# Patient Record
Sex: FEMALE | Race: BLACK OR AFRICAN AMERICAN | NOT HISPANIC OR LATINO | Employment: OTHER | URBAN - METROPOLITAN AREA
[De-identification: names, ages, dates, MRNs, and addresses within clinical notes are randomized per-mention and may not be internally consistent; named-entity substitution may affect disease eponyms.]

---

## 2020-09-27 ENCOUNTER — HOSPITAL ENCOUNTER (INPATIENT)
Facility: HOSPITAL | Age: 80
LOS: 1 days | Discharge: HOME | DRG: 305 | End: 2020-09-28
Attending: EMERGENCY MEDICINE | Admitting: INTERNAL MEDICINE
Payer: MEDICARE

## 2020-09-27 ENCOUNTER — APPOINTMENT (EMERGENCY)
Dept: CT IMAGING | Facility: HOSPITAL | Age: 80
DRG: 305 | End: 2020-09-27
Payer: MEDICARE

## 2020-09-27 ENCOUNTER — APPOINTMENT (EMERGENCY)
Dept: RADIOLOGY | Facility: HOSPITAL | Age: 80
DRG: 305 | End: 2020-09-27
Payer: MEDICARE

## 2020-09-27 DIAGNOSIS — R19.8 ALTERNATING CONSTIPATION AND DIARRHEA: ICD-10-CM

## 2020-09-27 DIAGNOSIS — I10 HYPERTENSION: ICD-10-CM

## 2020-09-27 DIAGNOSIS — E87.6 HYPOKALEMIA: ICD-10-CM

## 2020-09-27 DIAGNOSIS — I16.0 HYPERTENSIVE URGENCY: ICD-10-CM

## 2020-09-27 DIAGNOSIS — R41.82 ALTERED MENTAL STATUS: Primary | ICD-10-CM

## 2020-09-27 DIAGNOSIS — R53.1 WEAKNESS: ICD-10-CM

## 2020-09-27 PROBLEM — G93.40 ACUTE ENCEPHALOPATHY: Status: ACTIVE | Noted: 2020-09-27

## 2020-09-27 PROBLEM — K59.00 CONSTIPATION: Status: ACTIVE | Noted: 2020-09-27

## 2020-09-27 LAB
ALBUMIN SERPL BCP-MCNC: 3.8 G/DL (ref 3.5–5)
ALP SERPL-CCNC: 62 U/L (ref 46–116)
ALT SERPL W P-5'-P-CCNC: 20 U/L (ref 12–78)
ANION GAP SERPL CALCULATED.3IONS-SCNC: 10 MMOL/L (ref 4–13)
AST SERPL W P-5'-P-CCNC: 19 U/L (ref 5–45)
BACTERIA UR QL AUTO: ABNORMAL /HPF
BASOPHILS # BLD AUTO: 0.07 THOUSANDS/ΜL (ref 0–0.1)
BASOPHILS NFR BLD AUTO: 2 % (ref 0–1)
BILIRUB SERPL-MCNC: 0.8 MG/DL (ref 0.2–1)
BILIRUB UR QL STRIP: NEGATIVE
BUN SERPL-MCNC: 12 MG/DL (ref 5–25)
CALCIUM SERPL-MCNC: 9.5 MG/DL (ref 8.3–10.1)
CHLORIDE SERPL-SCNC: 103 MMOL/L (ref 100–108)
CLARITY UR: ABNORMAL
CO2 SERPL-SCNC: 30 MMOL/L (ref 21–32)
COLOR UR: ABNORMAL
CREAT SERPL-MCNC: 0.97 MG/DL (ref 0.6–1.3)
EOSINOPHIL # BLD AUTO: 0.18 THOUSAND/ΜL (ref 0–0.61)
EOSINOPHIL NFR BLD AUTO: 4 % (ref 0–6)
ERYTHROCYTE [DISTWIDTH] IN BLOOD BY AUTOMATED COUNT: 13.9 % (ref 11.6–15.1)
GFR SERPL CREATININE-BSD FRML MDRD: 65 ML/MIN/1.73SQ M
GLUCOSE SERPL-MCNC: 79 MG/DL (ref 65–140)
GLUCOSE SERPL-MCNC: 98 MG/DL (ref 65–140)
GLUCOSE UR STRIP-MCNC: NEGATIVE MG/DL
HCT VFR BLD AUTO: 40.2 % (ref 34.8–46.1)
HGB BLD-MCNC: 12.6 G/DL (ref 11.5–15.4)
HGB UR QL STRIP.AUTO: ABNORMAL
IMM GRANULOCYTES # BLD AUTO: 0.02 THOUSAND/UL (ref 0–0.2)
IMM GRANULOCYTES NFR BLD AUTO: 0 % (ref 0–2)
KETONES UR STRIP-MCNC: NEGATIVE MG/DL
LACTATE SERPL-SCNC: 0.7 MMOL/L (ref 0.5–2)
LEUKOCYTE ESTERASE UR QL STRIP: ABNORMAL
LYMPHOCYTES # BLD AUTO: 1.37 THOUSANDS/ΜL (ref 0.6–4.47)
LYMPHOCYTES NFR BLD AUTO: 30 % (ref 14–44)
MCH RBC QN AUTO: 26.5 PG (ref 26.8–34.3)
MCHC RBC AUTO-ENTMCNC: 31.3 G/DL (ref 31.4–37.4)
MCV RBC AUTO: 85 FL (ref 82–98)
MONOCYTES # BLD AUTO: 0.49 THOUSAND/ΜL (ref 0.17–1.22)
MONOCYTES NFR BLD AUTO: 11 % (ref 4–12)
NEUTROPHILS # BLD AUTO: 2.42 THOUSANDS/ΜL (ref 1.85–7.62)
NEUTS SEG NFR BLD AUTO: 53 % (ref 43–75)
NITRITE UR QL STRIP: NEGATIVE
NON-SQ EPI CELLS URNS QL MICRO: ABNORMAL /HPF
NRBC BLD AUTO-RTO: 0 /100 WBCS
NT-PROBNP SERPL-MCNC: 390 PG/ML
PH UR STRIP.AUTO: 7.5 [PH]
PLATELET # BLD AUTO: 304 THOUSANDS/UL (ref 149–390)
PMV BLD AUTO: 10.6 FL (ref 8.9–12.7)
POTASSIUM SERPL-SCNC: 2.6 MMOL/L (ref 3.5–5.3)
PROT SERPL-MCNC: 7.9 G/DL (ref 6.4–8.2)
PROT UR STRIP-MCNC: NEGATIVE MG/DL
RBC # BLD AUTO: 4.76 MILLION/UL (ref 3.81–5.12)
RBC #/AREA URNS AUTO: ABNORMAL /HPF
SODIUM SERPL-SCNC: 143 MMOL/L (ref 136–145)
SP GR UR STRIP.AUTO: 1.01 (ref 1–1.03)
TROPONIN I SERPL-MCNC: <0.02 NG/ML
TSH SERPL DL<=0.05 MIU/L-ACNC: 1.13 UIU/ML (ref 0.36–3.74)
UROBILINOGEN UR QL STRIP.AUTO: 0.2 E.U./DL
WBC # BLD AUTO: 4.55 THOUSAND/UL (ref 4.31–10.16)
WBC #/AREA URNS AUTO: ABNORMAL /HPF

## 2020-09-27 PROCEDURE — 84484 ASSAY OF TROPONIN QUANT: CPT | Performed by: NURSE PRACTITIONER

## 2020-09-27 PROCEDURE — G1004 CDSM NDSC: HCPCS

## 2020-09-27 PROCEDURE — 93005 ELECTROCARDIOGRAM TRACING: CPT

## 2020-09-27 PROCEDURE — 99285 EMERGENCY DEPT VISIT HI MDM: CPT | Performed by: NURSE PRACTITIONER

## 2020-09-27 PROCEDURE — 36415 COLL VENOUS BLD VENIPUNCTURE: CPT | Performed by: NURSE PRACTITIONER

## 2020-09-27 PROCEDURE — 83605 ASSAY OF LACTIC ACID: CPT | Performed by: NURSE PRACTITIONER

## 2020-09-27 PROCEDURE — 84443 ASSAY THYROID STIM HORMONE: CPT | Performed by: INTERNAL MEDICINE

## 2020-09-27 PROCEDURE — 96366 THER/PROPH/DIAG IV INF ADDON: CPT

## 2020-09-27 PROCEDURE — 96365 THER/PROPH/DIAG IV INF INIT: CPT

## 2020-09-27 PROCEDURE — 82948 REAGENT STRIP/BLOOD GLUCOSE: CPT

## 2020-09-27 PROCEDURE — 81001 URINALYSIS AUTO W/SCOPE: CPT | Performed by: NURSE PRACTITIONER

## 2020-09-27 PROCEDURE — 85025 COMPLETE CBC W/AUTO DIFF WBC: CPT | Performed by: NURSE PRACTITIONER

## 2020-09-27 PROCEDURE — 74177 CT ABD & PELVIS W/CONTRAST: CPT

## 2020-09-27 PROCEDURE — 83880 ASSAY OF NATRIURETIC PEPTIDE: CPT | Performed by: NURSE PRACTITIONER

## 2020-09-27 PROCEDURE — 99223 1ST HOSP IP/OBS HIGH 75: CPT | Performed by: INTERNAL MEDICINE

## 2020-09-27 PROCEDURE — 80053 COMPREHEN METABOLIC PANEL: CPT | Performed by: NURSE PRACTITIONER

## 2020-09-27 PROCEDURE — 99285 EMERGENCY DEPT VISIT HI MDM: CPT

## 2020-09-27 PROCEDURE — 71045 X-RAY EXAM CHEST 1 VIEW: CPT

## 2020-09-27 PROCEDURE — 70450 CT HEAD/BRAIN W/O DYE: CPT

## 2020-09-27 RX ORDER — POLYETHYLENE GLYCOL 3350 17 G/17G
17 POWDER, FOR SOLUTION ORAL DAILY
Status: DISCONTINUED | OUTPATIENT
Start: 2020-09-27 | End: 2020-09-28 | Stop reason: HOSPADM

## 2020-09-27 RX ORDER — POTASSIUM CHLORIDE 20 MEQ/1
40 TABLET, EXTENDED RELEASE ORAL ONCE
Status: COMPLETED | OUTPATIENT
Start: 2020-09-27 | End: 2020-09-27

## 2020-09-27 RX ORDER — LABETALOL 20 MG/4 ML (5 MG/ML) INTRAVENOUS SYRINGE
10 ONCE
Status: COMPLETED | OUTPATIENT
Start: 2020-09-27 | End: 2020-09-27

## 2020-09-27 RX ORDER — ACETAMINOPHEN 325 MG/1
650 TABLET ORAL EVERY 6 HOURS PRN
Status: DISCONTINUED | OUTPATIENT
Start: 2020-09-27 | End: 2020-09-28 | Stop reason: HOSPADM

## 2020-09-27 RX ORDER — CLONIDINE HYDROCHLORIDE 0.1 MG/1
0.1 TABLET ORAL ONCE
Status: COMPLETED | OUTPATIENT
Start: 2020-09-27 | End: 2020-09-27

## 2020-09-27 RX ORDER — LISINOPRIL 20 MG/1
20 TABLET ORAL DAILY
Status: DISCONTINUED | OUTPATIENT
Start: 2020-09-27 | End: 2020-09-28 | Stop reason: HOSPADM

## 2020-09-27 RX ORDER — DOCUSATE SODIUM 100 MG/1
100 CAPSULE, LIQUID FILLED ORAL 2 TIMES DAILY
Status: DISCONTINUED | OUTPATIENT
Start: 2020-09-27 | End: 2020-09-28 | Stop reason: HOSPADM

## 2020-09-27 RX ORDER — AMLODIPINE BESYLATE 5 MG/1
5 TABLET ORAL DAILY
Status: DISCONTINUED | OUTPATIENT
Start: 2020-09-27 | End: 2020-09-28 | Stop reason: HOSPADM

## 2020-09-27 RX ORDER — SODIUM CHLORIDE, SODIUM GLUCONATE, SODIUM ACETATE, POTASSIUM CHLORIDE, MAGNESIUM CHLORIDE, SODIUM PHOSPHATE, DIBASIC, AND POTASSIUM PHOSPHATE .53; .5; .37; .037; .03; .012; .00082 G/100ML; G/100ML; G/100ML; G/100ML; G/100ML; G/100ML; G/100ML
1000 INJECTION, SOLUTION INTRAVENOUS ONCE
Status: COMPLETED | OUTPATIENT
Start: 2020-09-27 | End: 2020-09-27

## 2020-09-27 RX ORDER — POTASSIUM CHLORIDE 14.9 MG/ML
20 INJECTION INTRAVENOUS ONCE
Status: DISCONTINUED | OUTPATIENT
Start: 2020-09-27 | End: 2020-09-27

## 2020-09-27 RX ORDER — SENNOSIDES 8.6 MG
1 TABLET ORAL DAILY
Status: DISCONTINUED | OUTPATIENT
Start: 2020-09-27 | End: 2020-09-28 | Stop reason: HOSPADM

## 2020-09-27 RX ORDER — LABETALOL 20 MG/4 ML (5 MG/ML) INTRAVENOUS SYRINGE
10 EVERY 4 HOURS PRN
Status: DISCONTINUED | OUTPATIENT
Start: 2020-09-27 | End: 2020-09-28 | Stop reason: HOSPADM

## 2020-09-27 RX ADMIN — SENNOSIDES 8.6 MG: 8.6 TABLET, FILM COATED ORAL at 17:52

## 2020-09-27 RX ADMIN — IOHEXOL 100 ML: 350 INJECTION, SOLUTION INTRAVENOUS at 12:10

## 2020-09-27 RX ADMIN — POLYETHYLENE GLYCOL 3350 17 G: 17 POWDER, FOR SOLUTION ORAL at 17:52

## 2020-09-27 RX ADMIN — CLONIDINE HYDROCHLORIDE 0.1 MG: 0.1 TABLET ORAL at 14:14

## 2020-09-27 RX ADMIN — LISINOPRIL 20 MG: 20 TABLET ORAL at 17:52

## 2020-09-27 RX ADMIN — DOCUSATE SODIUM 100 MG: 100 CAPSULE, LIQUID FILLED ORAL at 17:52

## 2020-09-27 RX ADMIN — SODIUM CHLORIDE, SODIUM GLUCONATE, SODIUM ACETATE, POTASSIUM CHLORIDE, MAGNESIUM CHLORIDE, SODIUM PHOSPHATE, DIBASIC, AND POTASSIUM PHOSPHATE 1000 ML: .53; .5; .37; .037; .03; .012; .00082 INJECTION, SOLUTION INTRAVENOUS at 11:56

## 2020-09-27 RX ADMIN — POTASSIUM CHLORIDE 40 MEQ: 1500 TABLET, EXTENDED RELEASE ORAL at 12:43

## 2020-09-27 RX ADMIN — ENOXAPARIN SODIUM 40 MG: 40 INJECTION SUBCUTANEOUS at 17:52

## 2020-09-27 RX ADMIN — LABETALOL 20 MG/4 ML (5 MG/ML) INTRAVENOUS SYRINGE 10 MG: at 14:16

## 2020-09-27 RX ADMIN — POTASSIUM CHLORIDE 40 MEQ: 1500 TABLET, EXTENDED RELEASE ORAL at 17:52

## 2020-09-27 RX ADMIN — AMLODIPINE BESYLATE 5 MG: 5 TABLET ORAL at 17:53

## 2020-09-28 VITALS
DIASTOLIC BLOOD PRESSURE: 90 MMHG | WEIGHT: 144.18 LBS | RESPIRATION RATE: 16 BRPM | HEIGHT: 65 IN | HEART RATE: 93 BPM | TEMPERATURE: 98.1 F | OXYGEN SATURATION: 96 % | BODY MASS INDEX: 24.02 KG/M2 | SYSTOLIC BLOOD PRESSURE: 150 MMHG

## 2020-09-28 PROBLEM — R33.9 URINARY RETENTION: Status: ACTIVE | Noted: 2020-09-28

## 2020-09-28 LAB
ANION GAP SERPL CALCULATED.3IONS-SCNC: 8 MMOL/L (ref 4–13)
ATRIAL RATE: 115 BPM
BASOPHILS # BLD AUTO: 0.05 THOUSANDS/ΜL (ref 0–0.1)
BASOPHILS NFR BLD AUTO: 1 % (ref 0–1)
BUN SERPL-MCNC: 13 MG/DL (ref 5–25)
CALCIUM SERPL-MCNC: 8.7 MG/DL (ref 8.3–10.1)
CHLORIDE SERPL-SCNC: 108 MMOL/L (ref 100–108)
CO2 SERPL-SCNC: 27 MMOL/L (ref 21–32)
CREAT SERPL-MCNC: 0.98 MG/DL (ref 0.6–1.3)
EOSINOPHIL # BLD AUTO: 0.17 THOUSAND/ΜL (ref 0–0.61)
EOSINOPHIL NFR BLD AUTO: 3 % (ref 0–6)
ERYTHROCYTE [DISTWIDTH] IN BLOOD BY AUTOMATED COUNT: 14 % (ref 11.6–15.1)
GFR SERPL CREATININE-BSD FRML MDRD: 64 ML/MIN/1.73SQ M
GLUCOSE SERPL-MCNC: 98 MG/DL (ref 65–140)
HCT VFR BLD AUTO: 37.2 % (ref 34.8–46.1)
HGB BLD-MCNC: 11.7 G/DL (ref 11.5–15.4)
IMM GRANULOCYTES # BLD AUTO: 0.02 THOUSAND/UL (ref 0–0.2)
IMM GRANULOCYTES NFR BLD AUTO: 0 % (ref 0–2)
LYMPHOCYTES # BLD AUTO: 1.74 THOUSANDS/ΜL (ref 0.6–4.47)
LYMPHOCYTES NFR BLD AUTO: 35 % (ref 14–44)
MCH RBC QN AUTO: 26.5 PG (ref 26.8–34.3)
MCHC RBC AUTO-ENTMCNC: 31.5 G/DL (ref 31.4–37.4)
MCV RBC AUTO: 84 FL (ref 82–98)
MONOCYTES # BLD AUTO: 0.44 THOUSAND/ΜL (ref 0.17–1.22)
MONOCYTES NFR BLD AUTO: 9 % (ref 4–12)
NEUTROPHILS # BLD AUTO: 2.57 THOUSANDS/ΜL (ref 1.85–7.62)
NEUTS SEG NFR BLD AUTO: 52 % (ref 43–75)
NRBC BLD AUTO-RTO: 0 /100 WBCS
P AXIS: 70 DEGREES
PLATELET # BLD AUTO: 263 THOUSANDS/UL (ref 149–390)
PMV BLD AUTO: 10 FL (ref 8.9–12.7)
POTASSIUM SERPL-SCNC: 3.6 MMOL/L (ref 3.5–5.3)
PR INTERVAL: 138 MS
QRS AXIS: -73 DEGREES
QRSD INTERVAL: 116 MS
QT INTERVAL: 374 MS
QTC INTERVAL: 517 MS
RBC # BLD AUTO: 4.41 MILLION/UL (ref 3.81–5.12)
SODIUM SERPL-SCNC: 143 MMOL/L (ref 136–145)
T WAVE AXIS: 68 DEGREES
VENTRICULAR RATE: 115 BPM
WBC # BLD AUTO: 4.99 THOUSAND/UL (ref 4.31–10.16)

## 2020-09-28 PROCEDURE — 93010 ELECTROCARDIOGRAM REPORT: CPT | Performed by: INTERNAL MEDICINE

## 2020-09-28 PROCEDURE — 99238 HOSP IP/OBS DSCHRG MGMT 30/<: CPT | Performed by: PHYSICIAN ASSISTANT

## 2020-09-28 PROCEDURE — 80048 BASIC METABOLIC PNL TOTAL CA: CPT | Performed by: INTERNAL MEDICINE

## 2020-09-28 PROCEDURE — 85025 COMPLETE CBC W/AUTO DIFF WBC: CPT | Performed by: INTERNAL MEDICINE

## 2020-09-28 PROCEDURE — 97166 OT EVAL MOD COMPLEX 45 MIN: CPT

## 2020-09-28 PROCEDURE — 97163 PT EVAL HIGH COMPLEX 45 MIN: CPT

## 2020-09-28 RX ORDER — LISINOPRIL 20 MG/1
20 TABLET ORAL DAILY
Qty: 30 TABLET | Refills: 0 | Status: SHIPPED | OUTPATIENT
Start: 2020-09-29 | End: 2020-09-28

## 2020-09-28 RX ORDER — AMLODIPINE BESYLATE 5 MG/1
5 TABLET ORAL DAILY
Qty: 30 TABLET | Refills: 0 | Status: SHIPPED | OUTPATIENT
Start: 2020-09-29 | End: 2021-10-08 | Stop reason: SDUPTHER

## 2020-09-28 RX ORDER — AMLODIPINE BESYLATE 5 MG/1
5 TABLET ORAL DAILY
Qty: 30 TABLET | Refills: 0 | Status: SHIPPED | OUTPATIENT
Start: 2020-09-29 | End: 2020-09-28

## 2020-09-28 RX ORDER — LISINOPRIL 20 MG/1
20 TABLET ORAL DAILY
Qty: 30 TABLET | Refills: 0 | Status: SHIPPED | OUTPATIENT
Start: 2020-09-29 | End: 2020-12-15 | Stop reason: SDUPTHER

## 2020-09-28 RX ADMIN — SENNOSIDES 8.6 MG: 8.6 TABLET, FILM COATED ORAL at 09:23

## 2020-09-28 RX ADMIN — AMLODIPINE BESYLATE 5 MG: 5 TABLET ORAL at 09:23

## 2020-09-28 RX ADMIN — LISINOPRIL 20 MG: 20 TABLET ORAL at 09:22

## 2020-09-28 RX ADMIN — POLYETHYLENE GLYCOL 3350 17 G: 17 POWDER, FOR SOLUTION ORAL at 09:31

## 2020-09-28 RX ADMIN — DOCUSATE SODIUM 100 MG: 100 CAPSULE, LIQUID FILLED ORAL at 09:22

## 2020-09-28 RX ADMIN — ENOXAPARIN SODIUM 40 MG: 40 INJECTION SUBCUTANEOUS at 09:23

## 2020-09-29 LAB
ATRIAL RATE: 82 BPM
P AXIS: 66 DEGREES
PR INTERVAL: 158 MS
QRS AXIS: -60 DEGREES
QRSD INTERVAL: 134 MS
QT INTERVAL: 436 MS
QTC INTERVAL: 509 MS
T WAVE AXIS: 74 DEGREES
VENTRICULAR RATE: 82 BPM

## 2020-09-29 PROCEDURE — 93010 ELECTROCARDIOGRAM REPORT: CPT | Performed by: INTERNAL MEDICINE

## 2020-12-15 ENCOUNTER — OFFICE VISIT (OUTPATIENT)
Dept: INTERNAL MEDICINE CLINIC | Facility: CLINIC | Age: 80
End: 2020-12-15
Payer: MEDICARE

## 2020-12-15 ENCOUNTER — APPOINTMENT (OUTPATIENT)
Dept: LAB | Facility: CLINIC | Age: 80
End: 2020-12-15
Payer: MEDICARE

## 2020-12-15 VITALS
WEIGHT: 140 LBS | BODY MASS INDEX: 23.32 KG/M2 | TEMPERATURE: 97.9 F | HEART RATE: 90 BPM | SYSTOLIC BLOOD PRESSURE: 158 MMHG | HEIGHT: 65 IN | DIASTOLIC BLOOD PRESSURE: 100 MMHG

## 2020-12-15 DIAGNOSIS — E87.6 HYPOKALEMIA: ICD-10-CM

## 2020-12-15 DIAGNOSIS — R35.0 FREQUENCY OF URINATION: ICD-10-CM

## 2020-12-15 DIAGNOSIS — I16.0 HYPERTENSIVE URGENCY: ICD-10-CM

## 2020-12-15 DIAGNOSIS — Z00.00 MEDICARE ANNUAL WELLNESS VISIT, SUBSEQUENT: ICD-10-CM

## 2020-12-15 DIAGNOSIS — G89.29 CHRONIC BILATERAL LOW BACK PAIN, UNSPECIFIED WHETHER SCIATICA PRESENT: ICD-10-CM

## 2020-12-15 DIAGNOSIS — Z28.21 REFUSED INFLUENZA VACCINE: ICD-10-CM

## 2020-12-15 DIAGNOSIS — G31.84 MILD COGNITIVE IMPAIRMENT WITH MEMORY LOSS: ICD-10-CM

## 2020-12-15 DIAGNOSIS — I16.0 HYPERTENSIVE URGENCY: Primary | ICD-10-CM

## 2020-12-15 DIAGNOSIS — M54.50 CHRONIC BILATERAL LOW BACK PAIN, UNSPECIFIED WHETHER SCIATICA PRESENT: ICD-10-CM

## 2020-12-15 DIAGNOSIS — Z28.21 REFUSED PNEUMOCOCCAL VACCINATION: ICD-10-CM

## 2020-12-15 DIAGNOSIS — E89.2 S/P REMOVAL OF PARATHYROID GLAND (HCC): ICD-10-CM

## 2020-12-15 PROBLEM — Z90.89 S/P REMOVAL OF PARATHYROID GLAND: Status: ACTIVE | Noted: 2020-12-15

## 2020-12-15 PROBLEM — Z98.890 S/P REMOVAL OF PARATHYROID GLAND: Status: ACTIVE | Noted: 2020-12-15

## 2020-12-15 LAB
ALBUMIN SERPL BCP-MCNC: 3.9 G/DL (ref 3.5–5)
ALP SERPL-CCNC: 66 U/L (ref 46–116)
ALT SERPL W P-5'-P-CCNC: 15 U/L (ref 12–78)
AMORPH URATE CRY URNS QL MICRO: ABNORMAL /HPF
ANION GAP SERPL CALCULATED.3IONS-SCNC: 7 MMOL/L (ref 4–13)
AST SERPL W P-5'-P-CCNC: 14 U/L (ref 5–45)
BACTERIA UR QL AUTO: ABNORMAL /HPF
BILIRUB SERPL-MCNC: 0.49 MG/DL (ref 0.2–1)
BILIRUB UR QL STRIP: ABNORMAL
BUN SERPL-MCNC: 14 MG/DL (ref 5–25)
CALCIUM SERPL-MCNC: 10 MG/DL (ref 8.3–10.1)
CHLORIDE SERPL-SCNC: 108 MMOL/L (ref 100–108)
CLARITY UR: CLEAR
CO2 SERPL-SCNC: 27 MMOL/L (ref 21–32)
COLOR UR: ABNORMAL
CREAT SERPL-MCNC: 1.15 MG/DL (ref 0.6–1.3)
GFR SERPL CREATININE-BSD FRML MDRD: 53 ML/MIN/1.73SQ M
GLUCOSE P FAST SERPL-MCNC: 94 MG/DL (ref 65–99)
GLUCOSE UR STRIP-MCNC: NEGATIVE MG/DL
HGB UR QL STRIP.AUTO: NEGATIVE
KETONES UR STRIP-MCNC: ABNORMAL MG/DL
LEUKOCYTE ESTERASE UR QL STRIP: ABNORMAL
MUCOUS THREADS UR QL AUTO: ABNORMAL
NITRITE UR QL STRIP: NEGATIVE
NON-SQ EPI CELLS URNS QL MICRO: ABNORMAL /HPF
PH UR STRIP.AUTO: 6 [PH]
POTASSIUM SERPL-SCNC: 3.6 MMOL/L (ref 3.5–5.3)
PROT SERPL-MCNC: 7.6 G/DL (ref 6.4–8.2)
PROT UR STRIP-MCNC: ABNORMAL MG/DL
RBC #/AREA URNS AUTO: ABNORMAL /HPF
SODIUM SERPL-SCNC: 142 MMOL/L (ref 136–145)
SP GR UR STRIP.AUTO: 1.02 (ref 1–1.03)
UROBILINOGEN UR QL STRIP.AUTO: 1 E.U./DL
WBC #/AREA URNS AUTO: ABNORMAL /HPF

## 2020-12-15 PROCEDURE — 99204 OFFICE O/P NEW MOD 45 MIN: CPT | Performed by: FAMILY MEDICINE

## 2020-12-15 PROCEDURE — G0439 PPPS, SUBSEQ VISIT: HCPCS | Performed by: FAMILY MEDICINE

## 2020-12-15 PROCEDURE — 36415 COLL VENOUS BLD VENIPUNCTURE: CPT

## 2020-12-15 PROCEDURE — 81001 URINALYSIS AUTO W/SCOPE: CPT | Performed by: FAMILY MEDICINE

## 2020-12-15 PROCEDURE — 80053 COMPREHEN METABOLIC PANEL: CPT

## 2020-12-15 RX ORDER — LISINOPRIL 20 MG/1
20 TABLET ORAL DAILY
Qty: 30 TABLET | Refills: 4 | Status: CANCELLED | OUTPATIENT
Start: 2020-12-15

## 2020-12-15 RX ORDER — LISINOPRIL 20 MG/1
20 TABLET ORAL DAILY
Qty: 30 TABLET | Refills: 0 | Status: SHIPPED | OUTPATIENT
Start: 2020-12-15 | End: 2020-12-15 | Stop reason: SDUPTHER

## 2020-12-15 RX ORDER — LISINOPRIL 20 MG/1
20 TABLET ORAL DAILY
Qty: 30 TABLET | Refills: 2 | Status: SHIPPED | OUTPATIENT
Start: 2020-12-15 | End: 2021-05-10 | Stop reason: SDUPTHER

## 2020-12-29 ENCOUNTER — CLINICAL SUPPORT (OUTPATIENT)
Dept: INTERNAL MEDICINE CLINIC | Facility: CLINIC | Age: 80
End: 2020-12-29

## 2020-12-29 VITALS — DIASTOLIC BLOOD PRESSURE: 98 MMHG | SYSTOLIC BLOOD PRESSURE: 148 MMHG

## 2020-12-29 DIAGNOSIS — I10 HYPERTENSION, UNSPECIFIED TYPE: Primary | ICD-10-CM

## 2021-02-12 DIAGNOSIS — Z23 ENCOUNTER FOR IMMUNIZATION: ICD-10-CM

## 2021-02-16 ENCOUNTER — TELEPHONE (OUTPATIENT)
Dept: GERIATRICS | Age: 81
End: 2021-02-16

## 2021-02-16 ENCOUNTER — APPOINTMENT (OUTPATIENT)
Dept: LAB | Facility: CLINIC | Age: 81
End: 2021-02-16
Payer: MEDICARE

## 2021-02-16 ENCOUNTER — OFFICE VISIT (OUTPATIENT)
Dept: INTERNAL MEDICINE CLINIC | Facility: CLINIC | Age: 81
End: 2021-02-16
Payer: MEDICARE

## 2021-02-16 VITALS
WEIGHT: 134.4 LBS | HEART RATE: 74 BPM | HEIGHT: 65 IN | SYSTOLIC BLOOD PRESSURE: 140 MMHG | TEMPERATURE: 97.9 F | DIASTOLIC BLOOD PRESSURE: 90 MMHG | BODY MASS INDEX: 22.39 KG/M2

## 2021-02-16 DIAGNOSIS — R41.89 PERSISTENT COGNITIVE IMPAIRMENT: ICD-10-CM

## 2021-02-16 DIAGNOSIS — R10.9 FLANK PAIN: Primary | ICD-10-CM

## 2021-02-16 DIAGNOSIS — I10 ESSENTIAL HYPERTENSION: ICD-10-CM

## 2021-02-16 PROCEDURE — 99214 OFFICE O/P EST MOD 30 MIN: CPT | Performed by: FAMILY MEDICINE

## 2021-02-16 PROCEDURE — 81001 URINALYSIS AUTO W/SCOPE: CPT | Performed by: FAMILY MEDICINE

## 2021-02-16 NOTE — TELEPHONE ENCOUNTER
[place] [address]  [phone]  Telephone Intake: Geriatric Assessment   NOTE: The patient lives with her brother Zia Bateman), Sister-in-Law Karen Fleming) and her son Roberta Choudhury)  Ileana Ji takes care of the 3 elderly adults in his home  The three elderly adults lived together in [place] up until October, 2020 when they moved in with Ileana Ji  Referral source: Dr Anh Zamora                          Patient's PCP: Dr Long Hameed                     Who called to schedule the appointment? Sandeep Ames (daughter) or Ileana Ji (son) ([phone])      (relationship to patient): [phone]  's phone number: [phone]              Is there a POA in place/If so, who has POA? Yes Karilyn Eisenmenger)  Others residing with patient: Brother, Sister-in-Law, Son (Hema Soheila Funes)    Reason for referral: Patient concerns  and Family member concerns regarding memory concerns, behavior changes/concerns and mood concerns  What is the goal of visit? Evaluate, diagnose     Has the patient been seen by a Neurologist? No  Has the patient ever been formally tested for memory/dementia? No  Has the patient ever been diagnosed with dementia? No      Preferred language? English  Can patient read English? Yes   Highest education level? Highest Level of Education: Bachelor's Degree   Does the patient wear glasses? Yes   Does the patient use hearing aids? No     First Visit: 4/12 at [facility] [place]  Conference Visit: 5/3 at [place] [address]  In office visit and family conference virtual/hybrid visit options explained? Yes     Caller was informed: Please make sure the patient is accompanied by someone who knows them well / a caregiver / family member to participate in this appointment  If a patient plans to attend the assessment alone, please route a message to the assigned provider  Office packet mailed out?  Yes

## 2021-02-16 NOTE — PROGRESS NOTES
FOLLOW-UP OFFICE VISIT  [facility]    NAME: Ted Roblero  AGE: 68 y o  SEX: female  : 1940     DATE: 2021     Assessment and Plan:     1  Flank pain- patient denies dysuria or frequency however a poor historian  Likely muscular spasm  Will repeat UA  Encourage trial OTC analgesia some heating pad for now  - UA w Reflex to Microscopic w Reflex to Culture -Lab Collect    2  Essential hypertension-  well controlled  Patient continue on lisinopril 20 mg daily and Norvasc 5 mg daily  3  Persistent cognitive impairment-  Not progressive  No signs or symptoms of aggressive behaviors  Sleep pattern still normal   Hygiene practices still normal   Patient is scheduled to have a fleshy atrophy assessment in the spring  Return in about 7 months (around 2021) for Next scheduled follow up  Chief Complaint:     Back pain      History of Present Illness:      21-year-old female past medical history significant for hypertension, hyperparathyroidism status post parathyroidectomy, and chronic back pain who presents for follow-up  Continues to exhibit signs and symptoms of cognitive impairment with memory loss  No  Aggressive behavior  No  elopment behavior  Continues to perform basic hygiene without prompting or assistance  Unable to cook without supervision  Presents with nephew who provided most of history  Patient continues to clean plane of left-sided flank/ back pain  This has been going on for months  UA study done months ago shows no significant evidence for infection  Patient does suffer for limited p o  intake of fluids  Nephew states that the trying to increase water intake  Patient denies any pain today  Patient denies any issue with p o  intake  The episodes of urinary or bladder incontinence  No episodes of vomiting or diarrhea  For the back pain patient has not tried any OTC analgesia        Scheduled to have a full geriatric assessment in April  Review of Systems:     Review of Systems   HENT: Negative for trouble swallowing  Gastrointestinal: Negative for abdominal pain and vomiting  Musculoskeletal: Positive for back pain  Problem List:     Patient Active Problem List   Diagnosis    Acute encephalopathy    Generalized weakness    Hypokalemia    Constipation    Hypertensive urgency    Urinary retention    Chronic bilateral low back pain    Mild cognitive impairment with memory loss    S/P removal of parathyroid gland        Objective:     /90 (BP Location: Right arm, Patient Position: Sitting) Comment: fd  Pulse 74   Temp 97 9 °F (36 6 °C) (Temporal)   Ht 5' 5" (1 651 m)   Wt 61 kg (134 lb 6 4 oz)   BMI 22 37 kg/m²     Physical Exam  HENT:      Head: Normocephalic  Right Ear: External ear normal       Left Ear: External ear normal    Eyes:      Conjunctiva/sclera: Conjunctivae normal       Comments: very early bilateral cataract  Cataract development   Cardiovascular:      Rate and Rhythm: Normal rate and regular rhythm  Pulmonary:      Effort: Pulmonary effort is normal       Breath sounds: Normal breath sounds  Abdominal:      General: Bowel sounds are normal       Palpations: Abdomen is soft  Musculoskeletal:      Lumbar back: She exhibits spasm  She exhibits no tenderness and no bony tenderness  Right lower leg: No edema  Left lower leg: No edema  Neurological:      Mental Status: She is alert  Comments:   Oriented to self only    Psychiatric:         Attention and Perception: Attention normal          Mood and Affect: Affect is inappropriate  Speech: Speech is tangential          Behavior: Behavior is cooperative  Cognition and Memory: Cognition is impaired           Pertinent Laboratory/Diagnostic Studies:    Laboratory Results: I have personally reviewed the pertinent laboratory results/reports     CBC:   Results from Last 12 Months   Lab Units 09/28/20  0624   WBC Thousand/uL 4 99   RBC Million/uL 4 41   HEMOGLOBIN g/dL 11 7   HEMATOCRIT % 37 2   MCV fL 84   MCH pg 26 5*   MCHC g/dL 31 5   RDW % 14 0   MPV fL 10 0   PLATELETS Thousands/uL 263   NRBC AUTO /100 WBCs 0   NEUTROS PCT % 52   LYMPHS PCT % 35   MONOS PCT % 9   EOS PCT % 3   BASOS PCT % 1   NEUTROS ABS Thousands/µL 2 57   LYMPHS ABS Thousands/µL 1 74   MONOS ABS Thousand/µL 0 44   EOS ABS Thousand/µL 0 17     Chemistry Profile:   Results from Last 12 Months   Lab Units 12/15/20  1340 09/28/20  0624   POTASSIUM mmol/L 3 6 3 6   CHLORIDE mmol/L 108 108   CO2 mmol/L 27 27   BUN mg/dL 14 13   CREATININE mg/dL 1 15 0 98   GLUCOSE FASTING mg/dL 94  --    GLUCOSE RANDOM mg/dL  --  98   CALCIUM mg/dL 10 0 8 7   AST U/L 14  --    ALT U/L 15  --    ALK PHOS U/L 66  --    EGFR ml/min/1 73sq m 53 64     Endocrine Studies:   Results from Last 12 Months   Lab Units 09/27/20  1104   TSH 3RD GENERATON uIU/mL 1 133       Radiology/Other Diagnostic Testing Results: I have personally reviewed pertinent reports          Marthena Aures D O Berkshire HathawayZenda Goldmann [facility]  2/16/2021 5:43 PM

## 2021-02-17 LAB
BACTERIA UR QL AUTO: ABNORMAL /HPF
BILIRUB UR QL STRIP: NEGATIVE
CLARITY UR: CLEAR
COLOR UR: YELLOW
GLUCOSE UR STRIP-MCNC: NEGATIVE MG/DL
HGB UR QL STRIP.AUTO: NEGATIVE
HYALINE CASTS #/AREA URNS LPF: ABNORMAL /LPF
KETONES UR STRIP-MCNC: NEGATIVE MG/DL
LEUKOCYTE ESTERASE UR QL STRIP: ABNORMAL
NITRITE UR QL STRIP: NEGATIVE
NON-SQ EPI CELLS URNS QL MICRO: ABNORMAL /HPF
PH UR STRIP.AUTO: 6 [PH]
PROT UR STRIP-MCNC: NEGATIVE MG/DL
RBC #/AREA URNS AUTO: ABNORMAL /HPF
SP GR UR STRIP.AUTO: 1.02 (ref 1–1.03)
UROBILINOGEN UR QL STRIP.AUTO: 1 E.U./DL
WBC #/AREA URNS AUTO: ABNORMAL /HPF

## 2021-02-23 PROBLEM — R82.81 STERILE PYURIA: Status: ACTIVE | Noted: 2021-02-23

## 2021-04-09 ENCOUNTER — TELEPHONE (OUTPATIENT)
Dept: GERIATRICS | Age: 81
End: 2021-04-09

## 2021-04-09 NOTE — TELEPHONE ENCOUNTER
Just after son confirmed appointment, staff were made aware of need to reschedule forward one week  2 voicemails left for Angie Martines and one for Sebastián asking for call back to office about moving appointment from 4/12/21 at 3 pm  to 4/19/21 at 4 pm     Due to an obligation at 4 pm on the day of conference appointment, 5/10/21 this needs to be moved to 2pm on 5/10/21

## 2021-04-09 NOTE — TELEPHONE ENCOUNTER
Pt's son called Health Call line about the phone call he received regarding his mothers appointment, he asked if there was another day --specifically 26th,     He's requesting if that would be possible and requesting a call back if this is possible, if not he stated he would be willing to work out the details of the 19th  However he would really appreciate a call back from office confirming or denying which appointment

## 2021-04-19 ENCOUNTER — CONSULT (OUTPATIENT)
Dept: GERIATRICS | Age: 81
End: 2021-04-19
Payer: MEDICARE

## 2021-04-19 VITALS
OXYGEN SATURATION: 98 % | TEMPERATURE: 97.1 F | DIASTOLIC BLOOD PRESSURE: 96 MMHG | WEIGHT: 139.2 LBS | SYSTOLIC BLOOD PRESSURE: 144 MMHG | HEIGHT: 63 IN | HEART RATE: 90 BPM | BODY MASS INDEX: 24.66 KG/M2 | RESPIRATION RATE: 18 BRPM

## 2021-04-19 DIAGNOSIS — G89.29 CHRONIC BILATERAL LOW BACK PAIN, UNSPECIFIED WHETHER SCIATICA PRESENT: ICD-10-CM

## 2021-04-19 DIAGNOSIS — G31.84 MILD COGNITIVE IMPAIRMENT WITH MEMORY LOSS: Primary | ICD-10-CM

## 2021-04-19 DIAGNOSIS — M54.50 CHRONIC BILATERAL LOW BACK PAIN, UNSPECIFIED WHETHER SCIATICA PRESENT: ICD-10-CM

## 2021-04-19 DIAGNOSIS — I10 ESSENTIAL HYPERTENSION: ICD-10-CM

## 2021-04-19 PROCEDURE — 99205 OFFICE O/P NEW HI 60 MIN: CPT | Performed by: INTERNAL MEDICINE

## 2021-04-19 NOTE — PROGRESS NOTES
[address]  [phone]  Social Work Intake    SOCIAL HISTORY  Patient: Saloni Do  Date:4/19/2021  Current Living Situation: Rubina Cheung resides at home with her son, brother, and sister-in-law  Her brother and sister-in-law also have cognitive impairments; her son is the primary caregiver for everyone within the home  They have lived there together since November 2020  Previously, her brother and sister-in-law lived in [place], [place], but were no longer safe to live alone/unsupervised  Caregiver main concern(s):  Identify appropriate caregiver supports and eligibility for services within the home  Is respite needed for caregiver(s)? Brief respite would certainly be beneficial  Rubina Cheung' daughter does travel to assist on the weekends and cooks all meals, assists with cleaning, etc  She lives in [place]  The family does all laundry  Who is available to provide care in case of illness or crisis (please specify relation to patient and level of care able to provide)? Son is full time caregiver, daughter assists as able and is regularly there (for long weekends), as well  FAMILY BACKGROUND  Marital status:    Does patient have children? Yes How Many? 2     EMPLOYMENT  Currently Employed:No    EDUCATION  Highest Level of Education: Bachelor's Degree      SERVICE  [place]: No        Benefits Describe (if applicable): N/A    FINANCIAL  Total Monthly income: ~350  Source of income: Social security  Meet current needs? No - family supplements her income to meet he needs  Funds available for home care? No  Funds available for nursing home? No  Do you rent or own your home?  Rent     RELATIONSHIPS  Are any relationships causing problems right now: No    [address] [employer] staff: Previously attended a senior center in [place]  Major life events in past two years (ex: assisted, death in family, major illness etc ): Move from [place] to [place]  Does the patient currently drive: No    If not, date stopped driving: Years ago - prior to 2013    [address] which have helped with shopping, meals, bathing, etc : Used to go to senior center in [facility] that assessment team feels would be beneficial to patient/family: Referral to [employer], additional caregiver supports through the [employer], educational information regarding dementia diagnosis    LEGAL  Advanced directives: Power of  is her daughter  SAFETY ASSESSMENT     Nyasia live in a 4 bedroom, 3 bath townhouse  She sleeps on the second floor and has her own bathroom  FIRE HAZARDS  Does the residence have smoke alarm? Yes     Does the residence have a fire escape? Yes   Are any of the following present in the residence? Frayed Wires       No   Clutter        No   Incorrect use of open flame     Circuit breaker turned off    FALL HAZARDS  Do any of the following exist in the residence? Poor lighting       No   Loose Rugs       No   Obstacles       No   Uneven floors       No   Slippery floors       No   Unsafe stairs       No - only one flight up to second floor  Does the patient use a fall alert? No   If yes, which one? N/A - has never fallen as of yet    HEALTH HAZARDS   Does the residence have any of the following? Adequate plumbing      Yes    Adequate heat       Yes    Adequate ventilation      Yes   Are there signs of neglect such as the following? Unkempt house      No   Old food in refrigerator     No   Infestation       No    EMERGENCY HEALTH PLAN   Is there a phone that is accessible to the patient or caregiver? Yes   Is the number to police, physician, and 911 easily accessible? No - does keep an emergency card in her wallet  Would the patient be able to call 911 in an emergency?   Yes     ENVIRONMENT APPROPRIATENESS  Please note if each is available and accessible to the patient:   Bedroom        Yes Bathroom        Yes   Kitchen        Yes   Living Room        Yes     Is the patient able to climb stairs if necessary? Yes   Does the patient use any assistant devices for ambulation? No   If yes, please list which device required   N/A    Does the bathroom have any of the following? Handrails in tub or toilet     No   Raised toilet seat      No   Rubber tub mat      Yes    Hot water       Yes     Can the patient independently do the following? Shower       Yes - daily   Dress them selves      Yes     Pick appropriate clothing     Yes    Eat        Yes    Drink        Yes       [place] Cognitive Assessment (MoCA) Version 8 1  Education: Bachelor's Degree    Points Earned POSSIBLE Points   Visuospatial/Executive   Alternating [place] Making 0 1   Visuoconstructional skills 0 1   Visuoconstructional skills (clock) 1 3   Naming   Naming Animals 1 3   Attention   Digit Span 1 2   Vigilance (letters) 0 1   Serial 7 subtraction Unable to complete 3   Language   Sentence Repetition 0 2   Verbal fluency Not completed 1   Abstraction   Abstraction (word pairings) Not completed 2   Delayed recall   Delayed recall 0 5   Memory index score: Not completed   Orientation   Orientation Not completed 6   TOTAL SCORE: Unable to complete  (Normal ?26/30)   Additional notes: Ms Alla Ames was quite nervous throughout completion of today's assessment  She repeated "I'm so nervous" and spoke about her mind, pointing to her head  She described difficulty with word-finding

## 2021-04-19 NOTE — PATIENT INSTRUCTIONS
1  Ensure to stay active physically, mentally and socially  2  Follow up with specialist and PCP periodically for management of your chronic conditions and preventative testing  3  Consider wearing fall alert daily  4  Contact PCP prior to starting Over the counter medications  Avoid OTC medications that can affect cognition including Benadryl, Tyelnol PM, etc    5  Consider brain stimulating activities including crossword puzzles, word search, reading or even learning a new hobby  6  Maintain a well balanced diet  Incorporate lots of vegetables and fruits in your diet  Things that we know are helpful for thinking and memory   1  Exercise program: gradually increase your physical activity over time  Start small and be patient  Aerobic (cardio) activity is best but incorporate balance, strength and flexibility training as well    a  Try to get at least 30min 3 times per week   2  Diet: Mediterranean diet (colorful fruits and vegetables, olive oil, fish, whole grains, very little red meet is any), MIND diet, anti-inflammatory diet  a  Stay well hydrated: drink 6-8 glasses of water per day   b  What's good for your heart is good for your brain  c  Avoid high-salt foods   3  Sleep: aim for at least 7-8 hours per night   a  Avoid alcohol and medications like Benadryl (Tylenol PM) or other sedating drugs  4  Stress management/mindfulness practice:   a  Talk with our social workers about finding a cognitive behavioral therapists  b  Try a smart phone chang like DriveK or mindfulness for beginners   i  Try curable for pain    c  Take a course in Mindfulness Based Stress Reduction (MBSR)   i  Ian murphy  Read or listen to an audiobook about it:  i  Mindfulness for beginners   ii  10% happier  iii  The happiness advantage   5   Social engagement:   a  Stay in touch with family and friends   b  Plan a few specific activities for your social health every week   c  Sridevi Lorenz a local support group   d  Volunteer! MIND diet score:  1 point for each component      · Green leafy vegetables: at least 6 per week  · Other vegetable: at least 1 per day   · Berries: at least 2 per week  · Red meat: fewer than 4 per week   · Fish: at least 1 per week   · Poultry: at least 2 per week  · Beans: at least 3 per week  · Nuts: at least 5 per week  · Fast or fried food: less than 1 per week  · Olive oil   · Butter less: less than 1 table-spoon per day   · Cheese: less than 1 serving per week   · Pastries/sweets: less than 5 servings per week  · Alcohol: no more than 1 serving/ day

## 2021-04-19 NOTE — PROGRESS NOTES
[facility]  Consultation  [address] [facility], [address] [place] [address] Drive   [phone]      NAME: Isadora Nguyen  AGE: 68 y o  SEX: female    DATE OF ENCOUNTER: 2021    Assessment and Plan   Dementia  - Previously diagnosed with dementia, increased forgetfulness and word finding, independent with her ADLs and requires assistance with her IADLS, +Family history  - MoCA in office attempted but unable to complete due to patient being extremely anxious and difficulty with reorientation  -patient encouraged to stay active physically, mentally and socially  - driving safety concerns:  Patient is currently not driving  - patient does not have fall alert  - Daughter is the POA, no living will or advance directives  - caregiver burnout addressed  - current plan discuss at length with patient and son, all questions were answered  - patient will return in 3 weeks and family will call in interim with questions or concerns  - maintain good control of all chronic medical conditions with close follow up with PCP and specialist  - TU seconds, low risk for falls  - Medications reviewed, appears appropriate at this time     Chronic bilateral low back pain, unspecified whether sciatica present  - Currently stable  - encouraged to use Tyelnol 650-975mg q8hrs as needed for pain  - encouraged follow up with PCP for continued monitoring    Essential hypertension  - blood pressures stable today  - Goal BP of <150/90  - Continue Lisinopril and Amlodipine  - Follow up with PCP for further management         - Counseling Documentation: patient was counseled regarding: instructions for management, risk factor reductions, patient and family education and risks and benefits of treatment options    Chief Complaint     Chief Complaint   Patient presents with    Geriatric Evaluation       History of Present Illness     We had the pleasure of evaluating Isadora Nguyen who is a 68 y o  female in Geriatric assessment clinic today  Memory issues were first noticed byfamily  They have been present since 2 year(s), and include  short term memory loss and have worsened  Roxie Stern reports she does not  drive, does not get lost in familiar settings, and has not had recent citations or accidents  She does not manage her own affairs such as balancing her checkbook, paying bills, and managing investments  She is currently independent with her ADLs including dressing, eating, ambulation, toileting or hygiene  She does not require prompting for her hygiene  Son does report that she is dependent on him for her IADLs including shopping, housekeeping, transport etc  Son reported an episode where she left the pot on the gas stove and forgot  She does not wander    She does not notice changes in her own mood or personality and has not been treated in the past for depression  She does not note any hearing or vision issues and do not report any sleep issues  She does not have a living will and does  have an appointed POA  Family members accompanying the patient today include son    They report the patients behaviors include the following: short term memory, word finding and processing etc  He reports that patient used to live in [place] and was moved to [facility] when she was diagnosed with breast cancer in 2013  Patient was living with him until recently he noticed that her memory was deteriorating  He reported patient requires a lot of redirection and reorientation but he is able to take care of her at home  Patient and son are living with patient's brother and sister in law who also have memory deficits       The following portions of the patient's history were reviewed and updated as appropriate: allergies, current medications, past family history, past medical history, past social history, past surgical history and problem list     Review of Systems     Review of Systems   Unable to perform ROS: Dementia       Active Problem List Patient Active Problem List   Diagnosis    Acute encephalopathy    Generalized weakness    Hypokalemia    Constipation    Hypertensive urgency    Urinary retention    Chronic bilateral low back pain    Mild cognitive impairment with memory loss    S/P removal of parathyroid gland    Sterile pyuria       Objective     /96 (BP Location: Left arm, Patient Position: Sitting, Cuff Size: Adult)   Pulse 90   Temp (!) 97 1 °F (36 2 °C) (Temporal)   Resp 18   Ht 5' 3 27" (1 607 m)   Wt 63 1 kg (139 lb 3 2 oz)   SpO2 98%   BMI 24 45 kg/m²     Physical Exam  Constitutional:       General: She is not in acute distress  Appearance: She is well-developed  She is not diaphoretic  HENT:      Head: Normocephalic and atraumatic  Nose: Nose normal    Eyes:      General:         Right eye: No discharge  Left eye: No discharge  Conjunctiva/sclera: Conjunctivae normal       Pupils: Pupils are equal, round, and reactive to light  Neck:      Musculoskeletal: Normal range of motion and neck supple  Cardiovascular:      Rate and Rhythm: Normal rate and regular rhythm  Heart sounds: Normal heart sounds  No murmur  Pulmonary:      Effort: Pulmonary effort is normal  No respiratory distress  Breath sounds: Normal breath sounds  Abdominal:      General: Bowel sounds are normal  There is no distension  Palpations: Abdomen is soft  Tenderness: There is no abdominal tenderness  Musculoskeletal:         General: No tenderness  Comments: TUG: 10 seconds, steady gait     Skin:     General: Skin is warm  Neurological:      Mental Status: She is alert  Comments: MOCA: unable to complete   Psychiatric:      Comments: GDS: unable to complete due to anxiety    Patient able to follow simple commands  When asked about the number of children she has, she reported 3, although she only has two children   She kept mentioning that her breast cancer was a year ago although it was in 2013  She is unable to state who the president is  Year: 2019, oriented to person and place         Pertinent Laboratory/Diagnostic Studies:  Chemistry Profile:   Lab Results   Component Value Date/Time    K 3 6 12/15/2020 01:40 PM     12/15/2020 01:40 PM    CO2 27 12/15/2020 01:40 PM    BUN 14 12/15/2020 01:40 PM    CREATININE 1 15 12/15/2020 01:40 PM    GLUC 98 09/28/2020 06:24 AM    GLUF 94 12/15/2020 01:40 PM    CALCIUM 10 0 12/15/2020 01:40 PM    AST 14 12/15/2020 01:40 PM    ALT 15 12/15/2020 01:40 PM    ALKPHOS 66 12/15/2020 01:40 PM    EGFR 53 12/15/2020 01:40 PM         Current Medications     Current Outpatient Medications:     amLODIPine (NORVASC) 5 mg tablet, Take 1 tablet (5 mg total) by mouth daily, Disp: 30 tablet, Rfl: 0    lisinopril (ZESTRIL) 20 mg tablet, Take 1 tablet (20 mg total) by mouth daily, Disp: 30 tablet, Rfl: 2    Health Maintenance     Health Maintenance   Topic Date Due    COVID-19 Vaccine (1) Never done    DTaP,Tdap,and Td Vaccines (1 - Tdap) Never done    Pneumococcal Vaccine: 65+ Years (1 of 1 - PPSV23) Never done    Influenza Vaccine (1) Never done    Fall Risk  12/15/2021    Depression Screening PHQ  12/15/2021    Medicare Annual Wellness Visit (AWV)  12/15/2021    BMI: Adult  04/19/2022    HIB Vaccine  Aged Out    Hepatitis B Vaccine  Aged Out    IPV Vaccine  Aged Out    Hepatitis A Vaccine  Aged Out    Meningococcal ACWY Vaccine  Aged Out    HPV Vaccine  Aged Out       There is no immunization history on file for this patient

## 2021-05-10 DIAGNOSIS — I16.0 HYPERTENSIVE URGENCY: ICD-10-CM

## 2021-05-10 NOTE — TELEPHONE ENCOUNTER
lisinopril (ZESTRIL) 20 mg tablet     60 days for this RX because she has 60 days left of her other med and he wants them to be the same      [facility]  [phone]

## 2021-05-11 ENCOUNTER — TELEPHONE (OUTPATIENT)
Dept: GERIATRICS | Age: 81
End: 2021-05-11

## 2021-05-11 RX ORDER — LISINOPRIL 20 MG/1
20 TABLET ORAL DAILY
Qty: 60 TABLET | Refills: 0 | Status: SHIPPED | OUTPATIENT
Start: 2021-05-11 | End: 2021-08-29

## 2021-05-11 NOTE — TELEPHONE ENCOUNTER
Left message to call back   Patients appt needs to be rescheduled to 6/7/21 3 pm due to providers schedule

## 2021-06-07 ENCOUNTER — TELEMEDICINE (OUTPATIENT)
Dept: GERIATRICS | Age: 81
End: 2021-06-07
Payer: MEDICARE

## 2021-06-07 DIAGNOSIS — I10 ESSENTIAL HYPERTENSION: Primary | ICD-10-CM

## 2021-06-07 DIAGNOSIS — G89.29 CHRONIC BILATERAL LOW BACK PAIN, UNSPECIFIED WHETHER SCIATICA PRESENT: ICD-10-CM

## 2021-06-07 DIAGNOSIS — M54.50 CHRONIC BILATERAL LOW BACK PAIN, UNSPECIFIED WHETHER SCIATICA PRESENT: ICD-10-CM

## 2021-06-07 DIAGNOSIS — F03.90 DEMENTIA WITHOUT BEHAVIORAL DISTURBANCE, UNSPECIFIED DEMENTIA TYPE (HCC): ICD-10-CM

## 2021-06-07 PROCEDURE — 99214 OFFICE O/P EST MOD 30 MIN: CPT | Performed by: INTERNAL MEDICINE

## 2021-06-07 NOTE — PROGRESS NOTES
[facility], [address] [place] [address]  [phone]  Care Conference    LCSW sent information to home address by mail, along with a copy of the care plan reviewed during today's conference  Provided the following resources:    General Information  - 10 Ways to Love Your Brain  - Memory loss ladder  - Packet with Alzheimer's Association information including: definition of dementia, communication tips for different stages, common behaviors and response strategies  - NIH AMANDA Caring for a Person with Alzheimer's Disease: Your Easy-to-Use Guide from the [employer] on [address] for [address] Support Group (meeting monthly by [employer])  - Alzheimer's Association Rx Pad (guide to website and 24/7 helpline, 7-901.180.2724)  - Alzheimer's [place] Caregiver Tip Sheet: IDEA! Strategy    Safety  - CDC home fall prevention checklist  - Alzheimer's Association home safety checklist  - [employer] personal alert services brochure    [address] containing home care/adult day centers, some of which cover [address] [place] [address] differences between senior center involvement and adult day centers (adult day centers recommended due to activities tailored to those with cognitive deficits and level of supervision provided)    Casandra Barrow' daughter reports that she is active within the home, dancing, doing the dishes and cleaning the kitchen  She enjoys these activities and socializes amongst her family  They also provide her with crosswords and word finds to keep her busy/stimulated, and play bingo  LCSW to remain available as needed

## 2021-06-08 NOTE — PROGRESS NOTES
Virtual Regular Visit      Assessment/Plan:    FAMILY PRESENT:   Brittany Rodriguez (daughter/POA), Nehemias Luna (son)   STAFF PRESENT:   Stella Goncalves MD, Renu Macario MD, Epifanio Mauro LCSW   Patient and Family Goals of Care:  Review cognitive decline and associated symptoms, discuss treatment options and care planning      Medical Concerns- Current/Historical: Chronic bilateral low back pain, essential hypertension         Geriatric Syndromes   Dementia   FINDINGS:  Neuropsychological   Dementia  o Marv Cognitive Assessment: (unable to complete)   Depression Screen  o Geriatric Depression Scale: (unable to complete)    Recommendations/Interventions  o Remain active physically, mentally and socially*  o Pharmaceutical and non-pharmaceutical interventions discussed   o Utilize reorientation and redirection as needed (dependent on situation)  o Participate in cognitively challenging exercises such as crosswords and puzzles  o Maintain chronic conditions under control; close follow-up with PCP and specialist  o Follow up in six months  Diagnostic Studies   Review of bloodwork  Physical Findings Impacting Function   Fall Risk Assessment:  Low (Timed Up & Go Test: ? 11 seconds)   Activities of Daily Living:  Independent   Instrumental Activities of Daily Living:  Dependent    Recommendations/Interventions  o Encourage appropriate footwear at all times  o Review fall risk prevention tips and adjust within the home environment as needed   o Recommend use of fall precautions including fall alert device  Medications Reviewed  Recommendations/Interventions  o Medications seem appropriate for present conditions  o Check with PCP before using over the counter medications (OTC)  o Avoid OTC medications that can affect cognition (e g , Benadryl, Tylenol PM)  o Also advised to avoid NSAIDs due to risk of GI bleed and renal impairment  Other Findings   BMI 24 45 kg/m2  o Maintain well-balanced diet; continue following with primary care physician regularly   Chronic bilateral low back pain  o Encouraged to use Tylenol 650-975mg every 8 hours as needed for pain  o Follow up with PCP for continued monitoring   Hypertension  o Continue Lisinopril and Amlodipine  o Goal BP of <150/90  o Follow up with PCP for further management  Recommended Health Maintenance   Immunizations, if not contraindicated:  Yearly Influenza vaccine, Pneumococcal vaccine every 5 years (65 years and over), Shingles vaccine  Social/Safety Concerns   Fall Risk   Socialization   Assistance/Supervision   Medication Management    Recommendations/Interventions  o Recommend use of fall alert with GPS as a safety precaution  o Consider adult day center for positive socialization and brief family respite  o [address] [place] [address] on Aging for possible eligible programs such as OPTIONS, Caregiver Support program, or WAIVER   o Consider assistance for medication administration such as blister packaging  o Maintain supervision and assistance in place currently by family  [address] Stress/Concern    o Establish advance directives and provide copy to primary care provider  o Consider Alzheimers Association caregiver support group  o Educational information provided  *Based on current recommendations by the CDC due to COVID-19, please maintain social distancing at this time  COMMENT:  Patient and family verbalized understanding above Care Plan  Reason for visit is   Chief Complaint   Patient presents with   [address]        Encounter provider Yadiel Vaca MD    Provider located at [address]  [address] [facility] [address]  [address] [place] [address]  [phone]      Recent Visits  No visits were found meeting these conditions     Showing recent visits within past 7 days and meeting all other requirements     Today's Visits  Date Type Provider Dept   06/07/21 Telemedicine Dana Obando MD [address] today's visits and meeting all other requirements     Future Appointments  No visits were found meeting these conditions  Showing future appointments within next 150 days and meeting all other requirements        The patient was identified by name and date of birth  Les Gamble was informed that this is a telemedicine visit and that the visit is being conducted through [employer] and patient was informed that this is a secure, HIPAA-compliant platform  She agrees to proceed     My office door was closed  The patient was notified the following individuals were present in the room Dr Annemarie Sharif and Laila Vallecillo  She acknowledged consent and understanding of privacy and security of the video platform  The patient has agreed to participate and understands they can discontinue the visit at any time  Patient is aware this is a billable service  Subjective  Les Gamble is a 68 y o  female who was seen for family conference via [address]  She was accompanied by her daughter and son  Patient reports being tired today  She denies any recent falls, life stressors or changes in her medications  Past Medical History:   Diagnosis Date    Dementia ([address] )        No past surgical history on file  Current Outpatient Medications   Medication Sig Dispense Refill    amLODIPine (NORVASC) 5 mg tablet Take 1 tablet (5 mg total) by mouth daily 30 tablet 0    lisinopril (ZESTRIL) 20 mg tablet Take 1 tablet (20 mg total) by mouth daily 60 tablet 0     No current facility-administered medications for this visit  No Known Allergies    Review of Systems   Unable to perform ROS: Dementia       Video Exam    Physical Exam  Constitutional:       General: She is not in acute distress  HENT:      Head: Normocephalic and atraumatic  Nose: Nose normal    Eyes:      Extraocular Movements: Extraocular movements intact     Neck: Musculoskeletal: Neck supple  Pulmonary:      Effort: Pulmonary effort is normal  No respiratory distress  Musculoskeletal: Normal range of motion  Neurological:      Mental Status: She is alert  Mental status is at baseline  I spent 60 minutes with patient today in which greater than 50% of the time was spent in counseling/coordination of care regarding diagnosis, disease progression, management, patient and family education  VIRTUAL VISIT DISCLAIMER    Shanteleelee Norm acknowledges that she has consented to an online visit or consultation  She understands that the online visit is based solely on information provided by her, and that, in the absence of a face-to-face physical evaluation by the physician, the diagnosis she receives is both limited and provisional in terms of accuracy and completeness  This is not intended to replace a full medical face-to-face evaluation by the physician  Tee Zheng understands and accepts these terms

## 2021-06-19 ENCOUNTER — TELEPHONE (OUTPATIENT)
Dept: INTERNAL MEDICINE CLINIC | Facility: CLINIC | Age: 81
End: 2021-06-19

## 2021-06-19 NOTE — TELEPHONE ENCOUNTER
Called to inform Queenie Colmenares from [facility] that the patients paperwork has been faxed, however, we do not have any recent Pneumo vaccination record on the patient

## 2021-06-19 NOTE — TELEPHONE ENCOUNTER
Primary Natasha [address] [place] [address]  Request Fax PT    Notes  Labs  Vaccine hx ( pneumo )    FAX - [phone]    [facility] [phone] / option 6

## 2021-06-22 ENCOUNTER — APPOINTMENT (OUTPATIENT)
Dept: LAB | Facility: CLINIC | Age: 81
End: 2021-06-22
Payer: MEDICARE

## 2021-06-22 DIAGNOSIS — F02.80 ALZHEIMER'S DISEASE (HCC): ICD-10-CM

## 2021-06-22 DIAGNOSIS — I10 ESSENTIAL HYPERTENSION, MALIGNANT: ICD-10-CM

## 2021-06-22 DIAGNOSIS — G30.9 ALZHEIMER'S DISEASE (HCC): ICD-10-CM

## 2021-06-22 LAB
ALBUMIN SERPL BCP-MCNC: 4.1 G/DL (ref 3.5–5)
ALP SERPL-CCNC: 56 U/L (ref 46–116)
ALT SERPL W P-5'-P-CCNC: 21 U/L (ref 12–78)
ANION GAP SERPL CALCULATED.3IONS-SCNC: 3 MMOL/L (ref 4–13)
AST SERPL W P-5'-P-CCNC: 21 U/L (ref 5–45)
BASOPHILS # BLD AUTO: 0.05 THOUSANDS/ΜL (ref 0–0.1)
BASOPHILS NFR BLD AUTO: 1 % (ref 0–1)
BILIRUB SERPL-MCNC: 0.68 MG/DL (ref 0.2–1)
BUN SERPL-MCNC: 13 MG/DL (ref 5–25)
CALCIUM SERPL-MCNC: 9.4 MG/DL (ref 8.3–10.1)
CHLORIDE SERPL-SCNC: 111 MMOL/L (ref 100–108)
CHOLEST SERPL-MCNC: 265 MG/DL (ref 50–200)
CO2 SERPL-SCNC: 29 MMOL/L (ref 21–32)
CREAT SERPL-MCNC: 0.9 MG/DL (ref 0.6–1.3)
EOSINOPHIL # BLD AUTO: 0.18 THOUSAND/ΜL (ref 0–0.61)
EOSINOPHIL NFR BLD AUTO: 4 % (ref 0–6)
ERYTHROCYTE [DISTWIDTH] IN BLOOD BY AUTOMATED COUNT: 13.4 % (ref 11.6–15.1)
FOLATE SERPL-MCNC: >20 NG/ML (ref 3.1–17.5)
GFR SERPL CREATININE-BSD FRML MDRD: 71 ML/MIN/1.73SQ M
GLUCOSE SERPL-MCNC: 87 MG/DL (ref 65–140)
HCT VFR BLD AUTO: 38.5 % (ref 34.8–46.1)
HDLC SERPL-MCNC: 69 MG/DL
HGB BLD-MCNC: 12 G/DL (ref 11.5–15.4)
IMM GRANULOCYTES # BLD AUTO: 0.01 THOUSAND/UL (ref 0–0.2)
IMM GRANULOCYTES NFR BLD AUTO: 0 % (ref 0–2)
LDLC SERPL CALC-MCNC: 181 MG/DL (ref 0–100)
LYMPHOCYTES # BLD AUTO: 1.56 THOUSANDS/ΜL (ref 0.6–4.47)
LYMPHOCYTES NFR BLD AUTO: 34 % (ref 14–44)
MCH RBC QN AUTO: 26.7 PG (ref 26.8–34.3)
MCHC RBC AUTO-ENTMCNC: 31.2 G/DL (ref 31.4–37.4)
MCV RBC AUTO: 86 FL (ref 82–98)
MONOCYTES # BLD AUTO: 0.53 THOUSAND/ΜL (ref 0.17–1.22)
MONOCYTES NFR BLD AUTO: 12 % (ref 4–12)
NEUTROPHILS # BLD AUTO: 2.24 THOUSANDS/ΜL (ref 1.85–7.62)
NEUTS SEG NFR BLD AUTO: 49 % (ref 43–75)
NONHDLC SERPL-MCNC: 196 MG/DL
NRBC BLD AUTO-RTO: 0 /100 WBCS
PLATELET # BLD AUTO: 291 THOUSANDS/UL (ref 149–390)
PMV BLD AUTO: 10.4 FL (ref 8.9–12.7)
POTASSIUM SERPL-SCNC: 3.5 MMOL/L (ref 3.5–5.3)
PROT SERPL-MCNC: 7.7 G/DL (ref 6.4–8.2)
RBC # BLD AUTO: 4.5 MILLION/UL (ref 3.81–5.12)
SODIUM SERPL-SCNC: 143 MMOL/L (ref 136–145)
TRIGL SERPL-MCNC: 76 MG/DL
TSH SERPL DL<=0.05 MIU/L-ACNC: 0.53 UIU/ML (ref 0.36–3.74)
VIT B12 SERPL-MCNC: 425 PG/ML (ref 100–900)
WBC # BLD AUTO: 4.57 THOUSAND/UL (ref 4.31–10.16)

## 2021-06-22 PROCEDURE — 82746 ASSAY OF FOLIC ACID SERUM: CPT

## 2021-06-22 PROCEDURE — 80053 COMPREHEN METABOLIC PANEL: CPT

## 2021-06-22 PROCEDURE — 84443 ASSAY THYROID STIM HORMONE: CPT

## 2021-06-22 PROCEDURE — 85025 COMPLETE CBC W/AUTO DIFF WBC: CPT

## 2021-06-22 PROCEDURE — 80061 LIPID PANEL: CPT

## 2021-06-22 PROCEDURE — 36415 COLL VENOUS BLD VENIPUNCTURE: CPT

## 2021-06-22 PROCEDURE — 82607 VITAMIN B-12: CPT

## 2021-07-08 ENCOUNTER — OFFICE VISIT (OUTPATIENT)
Dept: INTERNAL MEDICINE CLINIC | Facility: CLINIC | Age: 81
End: 2021-07-08
Payer: MEDICARE

## 2021-07-08 VITALS
TEMPERATURE: 97.9 F | WEIGHT: 137 LBS | SYSTOLIC BLOOD PRESSURE: 164 MMHG | DIASTOLIC BLOOD PRESSURE: 108 MMHG | BODY MASS INDEX: 24.27 KG/M2 | HEIGHT: 63 IN

## 2021-07-08 DIAGNOSIS — G89.29 CHRONIC BILATERAL LOW BACK PAIN, UNSPECIFIED WHETHER SCIATICA PRESENT: Primary | ICD-10-CM

## 2021-07-08 DIAGNOSIS — M54.50 CHRONIC BILATERAL LOW BACK PAIN, UNSPECIFIED WHETHER SCIATICA PRESENT: Primary | ICD-10-CM

## 2021-07-08 DIAGNOSIS — I16.0 HYPERTENSIVE URGENCY: ICD-10-CM

## 2021-07-08 DIAGNOSIS — M48.061 SPINAL STENOSIS OF LUMBAR REGION, UNSPECIFIED WHETHER NEUROGENIC CLAUDICATION PRESENT: ICD-10-CM

## 2021-07-08 PROCEDURE — 99214 OFFICE O/P EST MOD 30 MIN: CPT | Performed by: FAMILY MEDICINE

## 2021-07-08 RX ORDER — LIDOCAINE 50 MG/G
1 PATCH TOPICAL DAILY
Qty: 30 PATCH | Refills: 1 | Status: SHIPPED | OUTPATIENT
Start: 2021-07-08 | End: 2021-09-13

## 2021-07-08 RX ORDER — METHYLPREDNISOLONE 4 MG/1
TABLET ORAL
Qty: 21 EACH | Refills: 0 | Status: SHIPPED | OUTPATIENT
Start: 2021-07-08 | End: 2021-10-08

## 2021-07-08 NOTE — PATIENT INSTRUCTIONS
Use the steroid taper to help with pain  Use heating pad or lidocaine patch for pain relief  Get x ray done  If worsening arthritis will send to pain manegement for more longer lasting therapy

## 2021-07-08 NOTE — PROGRESS NOTES
FOLLOW-UP OFFICE VISIT  [facility]    NAME: Fatuma Doe  AGE: 80 y o  SEX: female  : 1940     DATE: 2021     Assessment and Plan:     Problem List Items Addressed This Visit        Cardiovascular and Mediastinum    Hypertensive urgency       Other    Chronic bilateral low back pain - Primary    Relevant Medications    methylPREDNISolone 4 MG tablet therapy pack    lidocaine (LIDODERM) 5 %    Other Relevant Orders    XR sacrum and coccyx    XR spine lumbar minimum 4 views non injury      Other Visit Diagnoses     Spinal stenosis of lumbar region, unspecified whether neurogenic claudication present        Relevant Orders    XR sacrum and coccyx    XR spine lumbar minimum 4 views non injury        Plan:  Chronic low back pain with known lumbar stenosis seen on imaging study in 2020  On exam significant spastic luteal, piriformis muscles as well  Likely combination of these 2 issues  Concern for sacral pathology due to tenderness along with sacroiliac joint  Imaging study noted above  Will trial  Steroid taper to resolve inflammation  To be used to conduction lidocaine patches  If imaging studies unchanged when compared to CT in 2020 will likely refer to physical therapy  And pain management  BP elevated  Patient asymptomatic  Didn't take antihypertensive this morning  Patient encouraged to take patient medication monitor for HTN emergency  Return if symptoms worsen or fail to improve  Chief Complaint:     Chief Complaint   Patient presents with    back pain     on & off for a month         History of Present Illness:   Pt presents today with son c/o  Low back low right side  Chronic  Tried tyenol, cold compress, bengay, icy hot but isn't working  Seems to be worsening over the past week  Pt wakes up bent over and unable to straighten out due to the pain  Pt denies radiation of pain   Quality of pain is tightness   Not burnign or hot  [id number] in the am after waking  No parasthesia of lower extremity  Pt didn't take her antihypertensive this morning  Review of Systems:     Review of Systems   Constitutional: Negative for chills and fever  Respiratory: Negative for shortness of breath  Cardiovascular: Negative for chest pain  Genitourinary: Negative for dysuria  Musculoskeletal: Positive for back pain  Negative for arthralgias  Neurological: Negative for syncope  Problem List:     Patient Active Problem List   Diagnosis    Acute encephalopathy    Generalized weakness    Hypokalemia    Constipation    Hypertensive urgency    Urinary retention    Chronic bilateral low back pain    Mild cognitive impairment with memory loss    S/P removal of parathyroid gland (HCC)    Sterile pyuria    Hypertension    Unspecified dementia without behavioral disturbance (HCC)        Objective:     BP (!) 164/108 (BP Location: Left arm, Patient Position: Sitting)   Temp 97 9 °F (36 6 °C) (Tympanic)   Ht 5' 3" (1 6 m)   Wt 62 1 kg (137 lb)   BMI 24 27 kg/m²     Physical Exam  HENT:      Head: Normocephalic  Right Ear: External ear normal       Left Ear: External ear normal    Eyes:      Conjunctiva/sclera: Conjunctivae normal    Cardiovascular:      Rate and Rhythm: Normal rate  Pulmonary:      Effort: Pulmonary effort is normal    Musculoskeletal:      Cervical back: No tenderness or bony tenderness  Thoracic back: No spasms or bony tenderness  Lumbar back: No bony tenderness  Negative right straight leg raise test and negative left straight leg raise test       Comments: Point tenderness along right glute, right piriformis and right SI joint  Spastic gluteal muscles as well  Neurological:      Mental Status: She is alert  Gait: Gait abnormal (side bending towards the right while ambulating )              Pertinent Laboratory/Diagnostic Studies:    Laboratory Results: I have personally reviewed the pertinent laboratory results/reports     Radiology/Other Diagnostic Testing Results: I have personally reviewed pertinent reports          Yair MORELOS HSPTLMaggie Rom [facility]  7/8/2021 10:04 AM

## 2021-07-09 ENCOUNTER — TELEPHONE (OUTPATIENT)
Dept: INTERNAL MEDICINE CLINIC | Facility: CLINIC | Age: 81
End: 2021-07-09

## 2021-07-09 NOTE — TELEPHONE ENCOUNTER
Prior Auth being processed for the Lidocaine (lidoderm) 5% patches  Patients covermymeds key is: [id number]  Unfortunately we are unable to authenticate through covermymeds with the information we have on file because it is saying that she lives in [address] [place] [address] but we have  [place] address on file in her chart  Rev=cieved the updated address from her daughter maurilio that is llisted on the patients HIPAA as [address]  Completed the [address] and it has been approved for the patient  Coverage Start Date:2021; Coverage End Date:2022

## 2021-07-10 ENCOUNTER — HOSPITAL ENCOUNTER (OUTPATIENT)
Dept: RADIOLOGY | Facility: HOSPITAL | Age: 81
Discharge: HOME | End: 2021-07-10
Payer: MEDICARE

## 2021-07-10 DIAGNOSIS — M48.061 SPINAL STENOSIS OF LUMBAR REGION, UNSPECIFIED WHETHER NEUROGENIC CLAUDICATION PRESENT: ICD-10-CM

## 2021-07-10 DIAGNOSIS — G89.29 CHRONIC BILATERAL LOW BACK PAIN, UNSPECIFIED WHETHER SCIATICA PRESENT: ICD-10-CM

## 2021-07-10 DIAGNOSIS — M54.50 CHRONIC BILATERAL LOW BACK PAIN, UNSPECIFIED WHETHER SCIATICA PRESENT: ICD-10-CM

## 2021-07-10 PROCEDURE — 72110 X-RAY EXAM L-2 SPINE 4/>VWS: CPT

## 2021-07-10 PROCEDURE — 72220 X-RAY EXAM SACRUM TAILBONE: CPT

## 2021-08-04 ENCOUNTER — HOSPITAL ENCOUNTER (OUTPATIENT)
Dept: MAMMOGRAPHY | Facility: CLINIC | Age: 81
Discharge: HOME | End: 2021-08-04
Payer: MEDICARE

## 2021-08-04 DIAGNOSIS — M85.80 OTHER SPECIFIED DISORDERS OF BONE DENSITY AND STRUCTURE, UNSPECIFIED SITE: ICD-10-CM

## 2021-08-04 PROCEDURE — 77080 DXA BONE DENSITY AXIAL: CPT

## 2021-08-29 DIAGNOSIS — I16.0 HYPERTENSIVE URGENCY: ICD-10-CM

## 2021-08-29 RX ORDER — LISINOPRIL 20 MG/1
20 TABLET ORAL DAILY
Qty: 60 TABLET | Refills: 0 | OUTPATIENT
Start: 2021-08-29 | End: 2021-10-28

## 2021-08-29 RX ORDER — LISINOPRIL 20 MG/1
TABLET ORAL
Qty: 30 TABLET | Refills: 0 | Status: SHIPPED | OUTPATIENT
Start: 2021-08-29 | End: 2021-10-08

## 2021-09-13 DIAGNOSIS — M54.50 CHRONIC BILATERAL LOW BACK PAIN, UNSPECIFIED WHETHER SCIATICA PRESENT: ICD-10-CM

## 2021-09-13 DIAGNOSIS — G89.29 CHRONIC BILATERAL LOW BACK PAIN, UNSPECIFIED WHETHER SCIATICA PRESENT: ICD-10-CM

## 2021-09-13 RX ORDER — LIDOCAINE 50 MG/G
1 PATCH TOPICAL DAILY
Qty: 30 PATCH | Refills: 1 | Status: SHIPPED | OUTPATIENT
Start: 2021-09-13 | End: 2021-10-13

## 2021-10-07 ENCOUNTER — TELEPHONE (OUTPATIENT)
Dept: INTERNAL MEDICINE CLINIC | Facility: CLINIC | Age: 81
End: 2021-10-07

## 2021-10-08 ENCOUNTER — OFFICE VISIT (OUTPATIENT)
Dept: INTERNAL MEDICINE CLINIC | Facility: CLINIC | Age: 81
End: 2021-10-08
Payer: MEDICARE

## 2021-10-08 VITALS
BODY MASS INDEX: 24.73 KG/M2 | SYSTOLIC BLOOD PRESSURE: 164 MMHG | RESPIRATION RATE: 14 BRPM | HEIGHT: 63 IN | TEMPERATURE: 97.9 F | DIASTOLIC BLOOD PRESSURE: 104 MMHG | WEIGHT: 139.6 LBS | OXYGEN SATURATION: 98 % | HEART RATE: 98 BPM

## 2021-10-08 DIAGNOSIS — G31.84 MILD COGNITIVE IMPAIRMENT WITH MEMORY LOSS: ICD-10-CM

## 2021-10-08 DIAGNOSIS — I10 PRIMARY HYPERTENSION: ICD-10-CM

## 2021-10-08 DIAGNOSIS — R94.31 ABNORMAL EKG: ICD-10-CM

## 2021-10-08 DIAGNOSIS — I16.0 HYPERTENSIVE URGENCY: ICD-10-CM

## 2021-10-08 DIAGNOSIS — R07.9 CHEST PAIN, UNSPECIFIED TYPE: Primary | ICD-10-CM

## 2021-10-08 PROCEDURE — 93000 ELECTROCARDIOGRAM COMPLETE: CPT | Performed by: NURSE PRACTITIONER

## 2021-10-08 PROCEDURE — 99214 OFFICE O/P EST MOD 30 MIN: CPT | Performed by: NURSE PRACTITIONER

## 2021-10-08 RX ORDER — LISINOPRIL 20 MG/1
TABLET ORAL
Qty: 30 TABLET | Refills: 0 | Status: SHIPPED | OUTPATIENT
Start: 2021-10-08 | End: 2021-10-08 | Stop reason: SDUPTHER

## 2021-10-08 RX ORDER — LISINOPRIL 20 MG/1
20 TABLET ORAL DAILY
Qty: 90 TABLET | Refills: 0 | Status: SHIPPED | OUTPATIENT
Start: 2021-10-08 | End: 2022-01-03

## 2021-10-08 RX ORDER — AMLODIPINE BESYLATE 5 MG/1
5 TABLET ORAL DAILY
Qty: 90 TABLET | Refills: 0 | Status: SHIPPED | OUTPATIENT
Start: 2021-10-08

## 2021-11-10 ENCOUNTER — HOSPITAL ENCOUNTER (OUTPATIENT)
Dept: NON INVASIVE DIAGNOSTICS | Facility: CLINIC | Age: 81
Discharge: HOME | End: 2021-11-10
Payer: MEDICARE

## 2021-11-10 VITALS
SYSTOLIC BLOOD PRESSURE: 164 MMHG | HEART RATE: 87 BPM | WEIGHT: 139 LBS | BODY MASS INDEX: 24.63 KG/M2 | DIASTOLIC BLOOD PRESSURE: 104 MMHG | HEIGHT: 63 IN

## 2021-11-10 DIAGNOSIS — R07.9 CHEST PAIN, UNSPECIFIED TYPE: ICD-10-CM

## 2021-11-10 DIAGNOSIS — R94.31 ABNORMAL EKG: ICD-10-CM

## 2021-11-10 LAB
AORTIC ROOT: 2.5 CM
AORTIC VALVE MEAN VELOCITY: 8 M/S
APICAL FOUR CHAMBER EJECTION FRACTION: 50 %
AV AREA BY CONTINUOUS VTI: 1.3 CM2
AV AREA PEAK VELOCITY: 1.1 CM2
AV LVOT MEAN GRADIENT: 1 MMHG
AV LVOT PEAK GRADIENT: 2 MMHG
AV MEAN GRADIENT: 3 MMHG
AV PEAK GRADIENT: 6 MMHG
AV VALVE AREA: 1.33 CM2
DOP CALC AO VTI: 20.17 CM
DOP CALC LVOT AREA: 2.54 CM2
DOP CALC LVOT DIAMETER: 1.8 CM
DOP CALC LVOT PEAK VEL VTI: 10.56 CM
DOP CALC LVOT PEAK VEL: 0.52 M/S
DOP CALC LVOT STROKE INDEX: 16.3 ML/M2
DOP CALC LVOT STROKE VOLUME: 26.86 CM3
E WAVE DECELERATION TIME: 118 MS
FRACTIONAL SHORTENING: 32 % (ref 28–44)
INTERVENTRICULAR SEPTUM IN DIASTOLE (PARASTERNAL SHORT AXIS VIEW): 0.8 CM
LEFT ATRIUM AREA SYSTOLE SINGLE PLANE A4C: 10.2 CM2
LEFT INTERNAL DIMENSION IN SYSTOLE: 2.5 CM (ref 2.1–4)
LEFT VENTRICULAR INTERNAL DIMENSION IN DIASTOLE: 3.7 CM (ref 3.84–5.72)
LEFT VENTRICULAR POSTERIOR WALL IN END DIASTOLE: 0.8 CM
LEFT VENTRICULAR STROKE VOLUME: 37 ML
MV E'TISSUE VEL-SEP: 6 CM/S
MV PEAK A VEL: 1.24 M/S
MV PEAK E VEL: 57 CM/S
MV STENOSIS PRESSURE HALF TIME: 0 MS
RIGHT ATRIUM AREA SYSTOLE A4C: 9.3 CM2
RIGHT VENTRICLE ID DIMENSION: 3.2 CM
SL CV LV EF: 50
SL CV PED ECHO LEFT VENTRICLE DIASTOLIC VOLUME (MOD BIPLANE) 2D: 59 ML
SL CV PED ECHO LEFT VENTRICLE SYSTOLIC VOLUME (MOD BIPLANE) 2D: 22 ML
TRICUSPID VALVE PEAK REGURGITATION VELOCITY: 2.18 M/S
TRICUSPID VALVE S': 0.5 CM/S
TV PEAK GRADIENT: 19 MMHG
Z-SCORE OF LEFT VENTRICULAR DIMENSION IN END SYSTOLE: -2.37

## 2021-11-10 PROCEDURE — 93306 TTE W/DOPPLER COMPLETE: CPT

## 2021-11-10 PROCEDURE — 93306 TTE W/DOPPLER COMPLETE: CPT | Performed by: INTERNAL MEDICINE

## 2021-11-11 ENCOUNTER — TELEPHONE (OUTPATIENT)
Dept: INTERNAL MEDICINE CLINIC | Facility: CLINIC | Age: 81
End: 2021-11-11

## 2022-01-03 ENCOUNTER — HOSPITAL ENCOUNTER (INPATIENT)
Facility: HOSPITAL | Age: 82
LOS: 1 days | Discharge: HOME | DRG: 640 | End: 2022-01-04
Attending: EMERGENCY MEDICINE | Admitting: STUDENT IN AN ORGANIZED HEALTH CARE EDUCATION/TRAINING PROGRAM
Payer: COMMERCIAL

## 2022-01-03 ENCOUNTER — APPOINTMENT (EMERGENCY)
Dept: RADIOLOGY | Facility: HOSPITAL | Age: 82
DRG: 640 | End: 2022-01-03
Payer: COMMERCIAL

## 2022-01-03 ENCOUNTER — APPOINTMENT (EMERGENCY)
Dept: CT IMAGING | Facility: HOSPITAL | Age: 82
DRG: 640 | End: 2022-01-03
Payer: COMMERCIAL

## 2022-01-03 DIAGNOSIS — R55 SYNCOPE: Primary | ICD-10-CM

## 2022-01-03 DIAGNOSIS — N17.9 ACUTE KIDNEY INJURY (HCC): ICD-10-CM

## 2022-01-03 DIAGNOSIS — U07.1 COVID-19 VIRUS INFECTION: ICD-10-CM

## 2022-01-03 DIAGNOSIS — K59.00 CONSTIPATION, UNSPECIFIED CONSTIPATION TYPE: ICD-10-CM

## 2022-01-03 DIAGNOSIS — I16.0 HYPERTENSIVE URGENCY: ICD-10-CM

## 2022-01-03 DIAGNOSIS — E87.6 HYPOKALEMIA: ICD-10-CM

## 2022-01-03 PROBLEM — R79.89 ELEVATED TROPONIN: Status: ACTIVE | Noted: 2022-01-03

## 2022-01-03 PROBLEM — J12.82 PNEUMONIA DUE TO COVID-19 VIRUS: Status: ACTIVE | Noted: 2022-01-03

## 2022-01-03 PROBLEM — R77.8 ELEVATED TROPONIN: Status: ACTIVE | Noted: 2022-01-03

## 2022-01-03 LAB
2HR DELTA HS TROPONIN: 38 NG/L
ALBUMIN SERPL BCP-MCNC: 3.7 G/DL (ref 3.5–5)
ALP SERPL-CCNC: 55 U/L (ref 46–116)
ALT SERPL W P-5'-P-CCNC: 25 U/L (ref 12–78)
ANION GAP SERPL CALCULATED.3IONS-SCNC: 12 MMOL/L (ref 4–13)
AST SERPL W P-5'-P-CCNC: 26 U/L (ref 5–45)
BASOPHILS # BLD AUTO: 0.04 THOUSANDS/ΜL (ref 0–0.1)
BASOPHILS NFR BLD AUTO: 1 % (ref 0–1)
BILIRUB SERPL-MCNC: 0.48 MG/DL (ref 0.2–1)
BUN SERPL-MCNC: 15 MG/DL (ref 5–25)
CALCIUM SERPL-MCNC: 8.9 MG/DL (ref 8.3–10.1)
CARDIAC TROPONIN I PNL SERPL HS: 24 NG/L
CARDIAC TROPONIN I PNL SERPL HS: 62 NG/L
CHLORIDE SERPL-SCNC: 104 MMOL/L (ref 100–108)
CO2 SERPL-SCNC: 30 MMOL/L (ref 21–32)
CREAT SERPL-MCNC: 1.35 MG/DL (ref 0.6–1.3)
EOSINOPHIL # BLD AUTO: 0.03 THOUSAND/ΜL (ref 0–0.61)
EOSINOPHIL NFR BLD AUTO: 1 % (ref 0–6)
ERYTHROCYTE [DISTWIDTH] IN BLOOD BY AUTOMATED COUNT: 13.6 % (ref 11.6–15.1)
FLUAV RNA RESP QL NAA+PROBE: NEGATIVE
FLUBV RNA RESP QL NAA+PROBE: NEGATIVE
GFR SERPL CREATININE-BSD FRML MDRD: 37 ML/MIN/1.73SQ M
GLUCOSE SERPL-MCNC: 123 MG/DL (ref 65–140)
HCT VFR BLD AUTO: 34.6 % (ref 34.8–46.1)
HGB BLD-MCNC: 11.1 G/DL (ref 11.5–15.4)
IMM GRANULOCYTES # BLD AUTO: 0.02 THOUSAND/UL (ref 0–0.2)
IMM GRANULOCYTES NFR BLD AUTO: 0 % (ref 0–2)
LACTATE SERPL-SCNC: 1.1 MMOL/L (ref 0.5–2)
LIPASE SERPL-CCNC: 113 U/L (ref 73–393)
LYMPHOCYTES # BLD AUTO: 0.7 THOUSANDS/ΜL (ref 0.6–4.47)
LYMPHOCYTES NFR BLD AUTO: 11 % (ref 14–44)
MAGNESIUM SERPL-MCNC: 2.1 MG/DL (ref 1.6–2.6)
MCH RBC QN AUTO: 27.5 PG (ref 26.8–34.3)
MCHC RBC AUTO-ENTMCNC: 32.1 G/DL (ref 31.4–37.4)
MCV RBC AUTO: 86 FL (ref 82–98)
MONOCYTES # BLD AUTO: 0.54 THOUSAND/ΜL (ref 0.17–1.22)
MONOCYTES NFR BLD AUTO: 8 % (ref 4–12)
NEUTROPHILS # BLD AUTO: 5.24 THOUSANDS/ΜL (ref 1.85–7.62)
NEUTS SEG NFR BLD AUTO: 79 % (ref 43–75)
NRBC BLD AUTO-RTO: 0 /100 WBCS
PLATELET # BLD AUTO: 254 THOUSANDS/UL (ref 149–390)
PMV BLD AUTO: 9.9 FL (ref 8.9–12.7)
POTASSIUM SERPL-SCNC: 2.5 MMOL/L (ref 3.5–5.3)
PROT SERPL-MCNC: 7.1 G/DL (ref 6.4–8.2)
RBC # BLD AUTO: 4.04 MILLION/UL (ref 3.81–5.12)
RSV RNA RESP QL NAA+PROBE: NEGATIVE
SARS-COV-2 RNA RESP QL NAA+PROBE: POSITIVE
SODIUM SERPL-SCNC: 146 MMOL/L (ref 136–145)
WBC # BLD AUTO: 6.57 THOUSAND/UL (ref 4.31–10.16)

## 2022-01-03 PROCEDURE — 93005 ELECTROCARDIOGRAM TRACING: CPT

## 2022-01-03 PROCEDURE — 96375 TX/PRO/DX INJ NEW DRUG ADDON: CPT

## 2022-01-03 PROCEDURE — 80053 COMPREHEN METABOLIC PANEL: CPT | Performed by: EMERGENCY MEDICINE

## 2022-01-03 PROCEDURE — 74176 CT ABD & PELVIS W/O CONTRAST: CPT

## 2022-01-03 PROCEDURE — 99223 1ST HOSP IP/OBS HIGH 75: CPT | Performed by: STUDENT IN AN ORGANIZED HEALTH CARE EDUCATION/TRAINING PROGRAM

## 2022-01-03 PROCEDURE — G1004 CDSM NDSC: HCPCS

## 2022-01-03 PROCEDURE — 83690 ASSAY OF LIPASE: CPT | Performed by: EMERGENCY MEDICINE

## 2022-01-03 PROCEDURE — 0241U HB NFCT DS VIR RESP RNA 4 TRGT: CPT | Performed by: EMERGENCY MEDICINE

## 2022-01-03 PROCEDURE — 96366 THER/PROPH/DIAG IV INF ADDON: CPT

## 2022-01-03 PROCEDURE — 36415 COLL VENOUS BLD VENIPUNCTURE: CPT | Performed by: EMERGENCY MEDICINE

## 2022-01-03 PROCEDURE — 71045 X-RAY EXAM CHEST 1 VIEW: CPT

## 2022-01-03 PROCEDURE — 99285 EMERGENCY DEPT VISIT HI MDM: CPT

## 2022-01-03 PROCEDURE — 85025 COMPLETE CBC W/AUTO DIFF WBC: CPT | Performed by: EMERGENCY MEDICINE

## 2022-01-03 PROCEDURE — 84484 ASSAY OF TROPONIN QUANT: CPT | Performed by: EMERGENCY MEDICINE

## 2022-01-03 PROCEDURE — 99285 EMERGENCY DEPT VISIT HI MDM: CPT | Performed by: EMERGENCY MEDICINE

## 2022-01-03 PROCEDURE — 96365 THER/PROPH/DIAG IV INF INIT: CPT

## 2022-01-03 PROCEDURE — 83735 ASSAY OF MAGNESIUM: CPT | Performed by: EMERGENCY MEDICINE

## 2022-01-03 PROCEDURE — 83605 ASSAY OF LACTIC ACID: CPT | Performed by: EMERGENCY MEDICINE

## 2022-01-03 RX ORDER — POTASSIUM CHLORIDE 20 MEQ/1
40 TABLET, EXTENDED RELEASE ORAL ONCE
Status: DISCONTINUED | OUTPATIENT
Start: 2022-01-03 | End: 2022-01-05 | Stop reason: HOSPADM

## 2022-01-03 RX ORDER — LISINOPRIL 20 MG/1
TABLET ORAL
Qty: 90 TABLET | Refills: 0 | Status: SHIPPED | OUTPATIENT
Start: 2022-01-03 | End: 2022-03-28

## 2022-01-03 RX ORDER — POTASSIUM CHLORIDE 14.9 MG/ML
20 INJECTION INTRAVENOUS
Status: COMPLETED | OUTPATIENT
Start: 2022-01-03 | End: 2022-01-04

## 2022-01-03 RX ORDER — ONDANSETRON 2 MG/ML
4 INJECTION INTRAMUSCULAR; INTRAVENOUS EVERY 6 HOURS PRN
Status: DISCONTINUED | OUTPATIENT
Start: 2022-01-03 | End: 2022-01-05 | Stop reason: HOSPADM

## 2022-01-03 RX ORDER — SODIUM CHLORIDE, SODIUM LACTATE, POTASSIUM CHLORIDE, CALCIUM CHLORIDE 600; 310; 30; 20 MG/100ML; MG/100ML; MG/100ML; MG/100ML
125 INJECTION, SOLUTION INTRAVENOUS CONTINUOUS
Status: CANCELLED | OUTPATIENT
Start: 2022-01-03

## 2022-01-03 RX ORDER — POTASSIUM CHLORIDE 20 MEQ/1
40 TABLET, EXTENDED RELEASE ORAL ONCE
Status: COMPLETED | OUTPATIENT
Start: 2022-01-03 | End: 2022-01-03

## 2022-01-03 RX ORDER — SENNOSIDES 8.6 MG
1 TABLET ORAL DAILY
Status: DISCONTINUED | OUTPATIENT
Start: 2022-01-04 | End: 2022-01-05 | Stop reason: HOSPADM

## 2022-01-03 RX ORDER — AMLODIPINE BESYLATE 5 MG/1
5 TABLET ORAL DAILY
Status: DISCONTINUED | OUTPATIENT
Start: 2022-01-04 | End: 2022-01-05 | Stop reason: HOSPADM

## 2022-01-03 RX ORDER — SODIUM CHLORIDE, SODIUM LACTATE, POTASSIUM CHLORIDE, CALCIUM CHLORIDE 600; 310; 30; 20 MG/100ML; MG/100ML; MG/100ML; MG/100ML
100 INJECTION, SOLUTION INTRAVENOUS CONTINUOUS
Status: DISCONTINUED | OUTPATIENT
Start: 2022-01-03 | End: 2022-01-05 | Stop reason: HOSPADM

## 2022-01-03 RX ORDER — HEPARIN SODIUM 5000 [USP'U]/ML
5000 INJECTION, SOLUTION INTRAVENOUS; SUBCUTANEOUS EVERY 8 HOURS SCHEDULED
Status: DISCONTINUED | OUTPATIENT
Start: 2022-01-03 | End: 2022-01-05 | Stop reason: HOSPADM

## 2022-01-03 RX ORDER — ACETAMINOPHEN 325 MG/1
650 TABLET ORAL EVERY 6 HOURS PRN
Status: DISCONTINUED | OUTPATIENT
Start: 2022-01-03 | End: 2022-01-05 | Stop reason: HOSPADM

## 2022-01-03 RX ORDER — SODIUM CHLORIDE 9 MG/ML
125 INJECTION, SOLUTION INTRAVENOUS CONTINUOUS
Status: DISCONTINUED | OUTPATIENT
Start: 2022-01-03 | End: 2022-01-05 | Stop reason: HOSPADM

## 2022-01-03 RX ORDER — LISINOPRIL 10 MG/1
20 TABLET ORAL DAILY
Status: DISCONTINUED | OUTPATIENT
Start: 2022-01-04 | End: 2022-01-03

## 2022-01-03 RX ORDER — CALCIUM CARBONATE 200(500)MG
1000 TABLET,CHEWABLE ORAL DAILY PRN
Status: DISCONTINUED | OUTPATIENT
Start: 2022-01-03 | End: 2022-01-05 | Stop reason: HOSPADM

## 2022-01-03 RX ORDER — ONDANSETRON 2 MG/ML
4 INJECTION INTRAMUSCULAR; INTRAVENOUS ONCE
Status: COMPLETED | OUTPATIENT
Start: 2022-01-03 | End: 2022-01-03

## 2022-01-03 RX ORDER — DOCUSATE SODIUM 100 MG/1
100 CAPSULE, LIQUID FILLED ORAL 2 TIMES DAILY
Status: DISCONTINUED | OUTPATIENT
Start: 2022-01-03 | End: 2022-01-05 | Stop reason: HOSPADM

## 2022-01-03 RX ORDER — HYDRALAZINE HYDROCHLORIDE 20 MG/ML
5 INJECTION INTRAMUSCULAR; INTRAVENOUS EVERY 6 HOURS PRN
Status: DISCONTINUED | OUTPATIENT
Start: 2022-01-03 | End: 2022-01-05 | Stop reason: HOSPADM

## 2022-01-03 RX ADMIN — ONDANSETRON 4 MG: 2 INJECTION INTRAMUSCULAR; INTRAVENOUS at 20:00

## 2022-01-03 RX ADMIN — POTASSIUM CHLORIDE 20 MEQ: 14.9 INJECTION, SOLUTION INTRAVENOUS at 23:05

## 2022-01-03 RX ADMIN — HEPARIN SODIUM 5000 UNITS: 5000 INJECTION INTRAVENOUS; SUBCUTANEOUS at 23:05

## 2022-01-03 RX ADMIN — POTASSIUM CHLORIDE 20 MEQ: 14.9 INJECTION, SOLUTION INTRAVENOUS at 20:01

## 2022-01-03 RX ADMIN — DOCUSATE SODIUM 100 MG: 100 CAPSULE, LIQUID FILLED ORAL at 23:04

## 2022-01-03 RX ADMIN — POTASSIUM CHLORIDE 40 MEQ: 1500 TABLET, EXTENDED RELEASE ORAL at 23:04

## 2022-01-03 RX ADMIN — SODIUM CHLORIDE 125 ML/HR: 0.9 INJECTION, SOLUTION INTRAVENOUS at 20:00

## 2022-01-03 NOTE — ED PROVIDER NOTES
History  Chief Complaint   Patient presents with    Syncope     Pt with syncopal event and vomiting, witnessed by family  Was sitting at table, did not fall out of chair  No recent injuries  History provided by:  Patient  Syncope  Episode history:  Single  Most recent episode: Today  Duration:  1 minute  Timing:  Rare  Progression:  Resolved  Chronicity:  New  Context: sitting down    Witnessed: yes    Relieved by:  Nothing  Worsened by:  Nothing  Ineffective treatments:  None tried  Associated symptoms: malaise/fatigue and vomiting (x 1 after syncope)    Associated symptoms: no chest pain, no diaphoresis, no difficulty breathing, no dizziness, no fever and no focal sensory loss        Prior to Admission Medications   Prescriptions Last Dose Informant Patient Reported? Taking? amLODIPine (NORVASC) 5 mg tablet   No No   Sig: Take 1 tablet (5 mg total) by mouth daily   lidocaine (LIDODERM) 5 %   No No   Sig: APPLY 1 PATCH TOPICALLY DAILY REMOVE & DISCARD PATCH WITHIN 12 HOURS OR AS DIRECTED BY MD   lisinopril (ZESTRIL) 20 mg tablet   No No   Sig: TAKE 1 TABLET BY MOUTH EVERY DAY      Facility-Administered Medications: None       Past Medical History:   Diagnosis Date    Dementia ([facility] [address] )     Hypertension        No past surgical history on file  Family History   Problem Relation Age of Onset    No Known Problems Mother     No Known Problems Father      I have reviewed and agree with the history as documented  E-Cigarette/Vaping    E-Cigarette Use Never User      E-Cigarette/Vaping Substances    Nicotine No     THC No     CBD No     Flavoring No     Other No     Unknown No      Social History     Tobacco Use    Smoking status: Never Smoker    Smokeless tobacco: Never Used   Vaping Use    Vaping Use: Never used   Substance Use Topics    Alcohol use: Never    Drug use: Never       Review of Systems   Constitutional: Positive for malaise/fatigue  Negative for diaphoresis and fever  Cardiovascular: Positive for syncope  Negative for chest pain  Gastrointestinal: Positive for vomiting (x 1 after syncope)  Neurological: Negative for dizziness  All other systems reviewed and are negative  Physical Exam  Physical Exam  Vitals and nursing note reviewed  Constitutional:       General: She is not in acute distress  Appearance: Normal appearance  She is well-developed  She is not toxic-appearing or diaphoretic  HENT:      Head: Normocephalic and atraumatic  Nose: Nose normal       Mouth/Throat:      Mouth: Mucous membranes are dry  Eyes:      Conjunctiva/sclera: Conjunctivae normal    Cardiovascular:      Rate and Rhythm: Normal rate and regular rhythm  Heart sounds: Normal heart sounds  Pulmonary:      Effort: Pulmonary effort is normal  No respiratory distress  Breath sounds: Normal breath sounds  Abdominal:      General: There is no distension  Palpations: Abdomen is soft  Tenderness: There is no abdominal tenderness  Musculoskeletal:         General: Normal range of motion  Cervical back: Normal range of motion and neck supple  Skin:     General: Skin is warm and dry  Neurological:      General: No focal deficit present  Mental Status: She is alert and oriented to person, place, and time  Mental status is at baseline  Cranial Nerves: No cranial nerve deficit  Motor: No weakness     Psychiatric:         Mood and Affect: Mood normal          Vital Signs  ED Triage Vitals [01/03/22 1801]   Temperature Pulse Respirations Blood Pressure SpO2   99 1 °F (37 3 °C) 89 (!) 23 162/75 97 %      Temp Source Heart Rate Source Patient Position - Orthostatic VS BP Location FiO2 (%)   Oral -- -- -- --      Pain Score       No Pain           Vitals:    01/03/22 1801   BP: 162/75   Pulse: 89         Visual Acuity      ED Medications  Medications   sodium chloride 0 9 % infusion (125 mL/hr Intravenous New Bag 1/3/22 2000)   potassium chloride 20 mEq IVPB (premix) (20 mEq Intravenous New Bag 1/3/22 2001)   amLODIPine (NORVASC) tablet 5 mg (has no administration in time range)   acetaminophen (TYLENOL) tablet 650 mg (has no administration in time range)   calcium carbonate (TUMS) chewable tablet 1,000 mg (has no administration in time range)   docusate sodium (COLACE) capsule 100 mg (has no administration in time range)   senna (SENOKOT) tablet 8 6 mg (has no administration in time range)   ondansetron (ZOFRAN) injection 4 mg (has no administration in time range)   heparin (porcine) subcutaneous injection 5,000 Units (has no administration in time range)   lactated ringers infusion (has no administration in time range)   potassium chloride (K-DUR,KLOR-CON) CR tablet 40 mEq (has no administration in time range)     Followed by   potassium chloride (K-DUR,KLOR-CON) CR tablet 40 mEq (has no administration in time range)   hydrALAZINE (APRESOLINE) injection 5 mg (has no administration in time range)   magnesium hydroxide (MILK OF MAGNESIA) oral suspension 30 mL (has no administration in time range)   ondansetron (ZOFRAN) injection 4 mg (4 mg Intravenous Given 1/3/22 2000)       Diagnostic Studies  Results Reviewed     Procedure Component Value Units Date/Time    HS Troponin I 2hr [[id number]] Collected: 01/03/22 2127    Lab Status:  In process Specimen: Blood from Arm, Left Updated: 01/03/22 2135    HS Troponin 0hr (reflex protocol) [[id number]]  (Normal) Collected: 01/03/22 1838    Lab Status: Final result Specimen: Blood from Arm, Left Updated: 01/03/22 2051     hs TnI 0hr 24 ng/L     HS Troponin I 4hr [[id number]]     Lab Status: No result Specimen: Blood     COVID/FLU/RSV - 2 hour TAT [[id number]]  (Abnormal) Collected: 01/03/22 1838    Lab Status: Final result Specimen: Nares from Nose Updated: 01/03/22 1931     SARS-CoV-2 Positive     INFLUENZA A PCR Negative     INFLUENZA B PCR Negative     RSV PCR Negative    Narrative:      FOR PEDIATRIC PATIENTS - copy/paste COVID Guidelines URL to browser: [email or web address]    SARS-CoV-2 assay is a Nucleic Acid Amplification assay intended for the  qualitative detection of nucleic acid from SARS-CoV-2 in nasopharyngeal  swabs  Results are for the presumptive identification of SARS-CoV-2 RNA  Positive results are indicative of infection with SARS-CoV-2, the virus  causing COVID-19, but do not rule out bacterial infection or co-infection  with other viruses  Laboratories within the [place] and its  territories are required to report all positive results to the appropriate  public health authorities  Negative results do not preclude SARS-CoV-2  infection and should not be used as the sole basis for treatment or other  patient management decisions  Negative results must be combined with  clinical observations, patient history, and epidemiological information  This test has not been FDA cleared or approved  This test has been authorized by FDA under an Emergency Use Authorization  (EUA)  This test is only authorized for the duration of time the  declaration that circumstances exist justifying the authorization of the  emergency use of an in vitro diagnostic tests for detection of SARS-CoV-2  virus and/or diagnosis of COVID-19 infection under section 564(b)(1) of  the Act, 21 U  S C  876DZI-9(M)(0), unless the authorization is terminated  or revoked sooner  The test has been validated but independent review by FDA  and CLIA is pending  Test performed using [email or web address] GeneXpert: This RT-PCR assay targets N2,  a region unique to SARS-CoV-2  A conserved region in the E-gene was chosen  for pan-Sarbecovirus detection which includes SARS-CoV-2      CBC and differential [[id number]]  (Abnormal) Collected: 01/03/22 1905    Lab Status: Final result Specimen: Blood from Arm, Left Updated: 01/03/22 1927     WBC 6 57 Thousand/uL      RBC 4 04 Million/uL      Hemoglobin 11 1 g/dL      Hematocrit 34 6 %      MCV 86 fL      MCH 27 5 pg      MCHC 32 1 g/dL      RDW 13 6 %      MPV 9 9 fL      Platelets 906 Thousands/uL      nRBC 0 /100 WBCs      Neutrophils Relative 79 %      Immat GRANS % 0 %      Lymphocytes Relative 11 %      Monocytes Relative 8 %      Eosinophils Relative 1 %      Basophils Relative 1 %      Neutrophils Absolute 5 24 Thousands/µL      Immature Grans Absolute 0 02 Thousand/uL      Lymphocytes Absolute 0 70 Thousands/µL      Monocytes Absolute 0 54 Thousand/µL      Eosinophils Absolute 0 03 Thousand/µL      Basophils Absolute 0 04 Thousands/µL     Comprehensive metabolic panel [[id number]]  (Abnormal) Collected: 01/03/22 1838    Lab Status: Final result Specimen: Blood from Arm, Left Updated: 01/03/22 1908     Sodium 146 mmol/L      Potassium 2 5 mmol/L      Chloride 104 mmol/L      CO2 30 mmol/L      ANION GAP 12 mmol/L      BUN 15 mg/dL      Creatinine 1 35 mg/dL      Glucose 123 mg/dL      Calcium 8 9 mg/dL      AST 26 U/L      ALT 25 U/L      Alkaline Phosphatase 55 U/L      Total Protein 7 1 g/dL      Albumin 3 7 g/dL      Total Bilirubin 0 48 mg/dL      eGFR 37 ml/min/1 73sq m     Narrative:      [place] guidelines for Chronic Kidney Disease (CKD):     Stage 1 with normal or high GFR (GFR > 90 mL/min/1 73 square meters)    Stage 2 Mild CKD (GFR = 60-89 mL/min/1 73 square meters)    Stage 3A Moderate CKD (GFR = 45-59 mL/min/1 73 square meters)    Stage 3B Moderate CKD (GFR = 30-44 mL/min/1 73 square meters)    Stage 4 Severe CKD (GFR = 15-29 mL/min/1 73 square meters)    Stage 5 End Stage CKD (GFR <15 mL/min/1 73 square meters)  Note: GFR calculation is accurate only with a steady state creatinine    Lactic acid [[id number]]  (Normal) Collected: 01/03/22 1838    Lab Status: Final result Specimen: Blood from Arm, Left Updated: 01/03/22 1905     LACTIC ACID 1 1 mmol/L     Narrative:      Result may be elevated if tourniquet was used during collection  Lipase [[id number]]  (Normal) Collected: 01/03/22 1838    Lab Status: Final result Specimen: Blood from Arm, Left Updated: 01/03/22 1902     Lipase 113 u/L     Magnesium [[id number]]  (Normal) Collected: 01/03/22 1838    Lab Status: Final result Specimen: Blood from Arm, Left Updated: 01/03/22 1902     Magnesium 2 1 mg/dL                  CT abdomen pelvis wo contrast   Final Result by Marietta Campoverde MD (01/03 2133)      Massive distention of the colon due to constipation with large amount of fecal material in the proximal sigmoid colon which is markedly redundant  Findings have worsened compared to prior study  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          Workstation performed: [id number]         XR chest 1 view portable   ED Interpretation by Morgan Mendez MD (01/03 1918)   NAD                 Procedures  ECG 12 Lead Documentation Only    Date/Time: 1/3/2022 7:21 PM  Performed by: Morgan Mendez MD  Authorized by: Morgan Mendez MD     Indications / Diagnosis:  Syncope  ECG reviewed by me, the ED Provider: yes    Patient location:  ED  Rate:     ECG rate:  89    ECG rate assessment: normal    Rhythm:     Rhythm: sinus rhythm    Conduction:     Conduction: abnormal      Abnormal conduction: bifascicular block               ED Course                                             MDM  Number of Diagnoses or Management Options  COVID-19 virus infection: new and requires workup  Hypokalemia: new and requires workup  Syncope: new and requires workup     Amount and/or Complexity of Data Reviewed  Clinical lab tests: ordered and reviewed  Tests in the radiology section of CPT®: ordered and reviewed  Independent visualization of images, tracings, or specimens: yes    Risk of Complications, Morbidity, and/or Mortality  Presenting problems: high  Management options: high    Patient Progress  Patient progress: stable      Disposition  Final diagnoses:   Syncope COVID-19 virus infection   Hypokalemia     Time reflects when diagnosis was documented in both MDM as applicable and the Disposition within this note     Time User Action Codes Description Comment    1/3/2022  7:33 PM Thai West Add [R55] Syncope     1/3/2022  7:33 PM Navin Garcia, [address] [U07 1] COVID-19 virus infection     1/3/2022  7:33 PM Adah Vicenta LINARES Add [E87 6] Hypokalemia       ED Disposition     ED Disposition Condition Date/Time Comment    Admit Stable Mon Refugio 3, 2022  7:33 PM         Follow-up Information    None         Patient's Medications   Discharge Prescriptions    No medications on file       No discharge procedures on file      PDMP Review       Value Time User    PDMP Reviewed  Yes 9/28/2020 12:30 PM Vandana Sepulveda PA-C          ED Provider  Electronically Signed by           Sohail Thacker MD  01/03/22 1991

## 2022-01-04 VITALS
HEART RATE: 66 BPM | TEMPERATURE: 97.9 F | DIASTOLIC BLOOD PRESSURE: 59 MMHG | BODY MASS INDEX: 21.37 KG/M2 | OXYGEN SATURATION: 96 % | SYSTOLIC BLOOD PRESSURE: 116 MMHG | RESPIRATION RATE: 18 BRPM | HEIGHT: 63 IN | WEIGHT: 120.59 LBS

## 2022-01-04 PROBLEM — N17.9 ACUTE KIDNEY INJURY (HCC): Status: RESOLVED | Noted: 2022-01-03 | Resolved: 2022-01-04

## 2022-01-04 LAB
4HR DELTA HS TROPONIN: 35 NG/L
ALBUMIN SERPL BCP-MCNC: 2.9 G/DL (ref 3.5–5)
ALP SERPL-CCNC: 44 U/L (ref 46–116)
ALT SERPL W P-5'-P-CCNC: 21 U/L (ref 12–78)
ANION GAP SERPL CALCULATED.3IONS-SCNC: 9 MMOL/L (ref 4–13)
AST SERPL W P-5'-P-CCNC: 24 U/L (ref 5–45)
ATRIAL RATE: 88 BPM
ATRIAL RATE: 89 BPM
BASOPHILS # BLD AUTO: 0.02 THOUSANDS/ΜL (ref 0–0.1)
BASOPHILS NFR BLD AUTO: 1 % (ref 0–1)
BILIRUB SERPL-MCNC: 0.41 MG/DL (ref 0.2–1)
BUN SERPL-MCNC: 10 MG/DL (ref 5–25)
CALCIUM ALBUM COR SERPL-MCNC: 8.9 MG/DL (ref 8.3–10.1)
CALCIUM SERPL-MCNC: 8 MG/DL (ref 8.3–10.1)
CARDIAC TROPONIN I PNL SERPL HS: 59 NG/L
CHLORIDE SERPL-SCNC: 109 MMOL/L (ref 100–108)
CO2 SERPL-SCNC: 29 MMOL/L (ref 21–32)
CREAT SERPL-MCNC: 1.03 MG/DL (ref 0.6–1.3)
CRP SERPL QL: <3 MG/L
EOSINOPHIL # BLD AUTO: 0 THOUSAND/ΜL (ref 0–0.61)
EOSINOPHIL NFR BLD AUTO: 0 % (ref 0–6)
ERYTHROCYTE [DISTWIDTH] IN BLOOD BY AUTOMATED COUNT: 13.6 % (ref 11.6–15.1)
GFR SERPL CREATININE-BSD FRML MDRD: 51 ML/MIN/1.73SQ M
GLUCOSE SERPL-MCNC: 97 MG/DL (ref 65–140)
HCT VFR BLD AUTO: 32.4 % (ref 34.8–46.1)
HGB BLD-MCNC: 10.4 G/DL (ref 11.5–15.4)
IMM GRANULOCYTES # BLD AUTO: 0.01 THOUSAND/UL (ref 0–0.2)
IMM GRANULOCYTES NFR BLD AUTO: 0 % (ref 0–2)
LYMPHOCYTES # BLD AUTO: 0.98 THOUSANDS/ΜL (ref 0.6–4.47)
LYMPHOCYTES NFR BLD AUTO: 27 % (ref 14–44)
MAGNESIUM SERPL-MCNC: 1.7 MG/DL (ref 1.6–2.6)
MCH RBC QN AUTO: 27.6 PG (ref 26.8–34.3)
MCHC RBC AUTO-ENTMCNC: 32.1 G/DL (ref 31.4–37.4)
MCV RBC AUTO: 86 FL (ref 82–98)
MONOCYTES # BLD AUTO: 0.4 THOUSAND/ΜL (ref 0.17–1.22)
MONOCYTES NFR BLD AUTO: 11 % (ref 4–12)
NEUTROPHILS # BLD AUTO: 2.22 THOUSANDS/ΜL (ref 1.85–7.62)
NEUTS SEG NFR BLD AUTO: 61 % (ref 43–75)
NRBC BLD AUTO-RTO: 0 /100 WBCS
P AXIS: 68 DEGREES
P AXIS: 70 DEGREES
PLATELET # BLD AUTO: 234 THOUSANDS/UL (ref 149–390)
PMV BLD AUTO: 10 FL (ref 8.9–12.7)
POTASSIUM SERPL-SCNC: 2.7 MMOL/L (ref 3.5–5.3)
POTASSIUM SERPL-SCNC: 3.7 MMOL/L (ref 3.5–5.3)
PR INTERVAL: 160 MS
PR INTERVAL: 164 MS
PROCALCITONIN SERPL-MCNC: 0.08 NG/ML
PROT SERPL-MCNC: 5.8 G/DL (ref 6.4–8.2)
QRS AXIS: -42 DEGREES
QRS AXIS: -43 DEGREES
QRSD INTERVAL: 134 MS
QRSD INTERVAL: 138 MS
QT INTERVAL: 424 MS
QT INTERVAL: 428 MS
QTC INTERVAL: 513 MS
QTC INTERVAL: 520 MS
RBC # BLD AUTO: 3.77 MILLION/UL (ref 3.81–5.12)
SODIUM SERPL-SCNC: 147 MMOL/L (ref 136–145)
T WAVE AXIS: 63 DEGREES
T WAVE AXIS: 77 DEGREES
VENTRICULAR RATE: 88 BPM
VENTRICULAR RATE: 89 BPM
WBC # BLD AUTO: 3.63 THOUSAND/UL (ref 4.31–10.16)

## 2022-01-04 PROCEDURE — 93010 ELECTROCARDIOGRAM REPORT: CPT | Performed by: INTERNAL MEDICINE

## 2022-01-04 PROCEDURE — 36415 COLL VENOUS BLD VENIPUNCTURE: CPT | Performed by: STUDENT IN AN ORGANIZED HEALTH CARE EDUCATION/TRAINING PROGRAM

## 2022-01-04 PROCEDURE — 86140 C-REACTIVE PROTEIN: CPT | Performed by: STUDENT IN AN ORGANIZED HEALTH CARE EDUCATION/TRAINING PROGRAM

## 2022-01-04 PROCEDURE — 97166 OT EVAL MOD COMPLEX 45 MIN: CPT

## 2022-01-04 PROCEDURE — 80053 COMPREHEN METABOLIC PANEL: CPT | Performed by: STUDENT IN AN ORGANIZED HEALTH CARE EDUCATION/TRAINING PROGRAM

## 2022-01-04 PROCEDURE — 83735 ASSAY OF MAGNESIUM: CPT | Performed by: STUDENT IN AN ORGANIZED HEALTH CARE EDUCATION/TRAINING PROGRAM

## 2022-01-04 PROCEDURE — 84145 PROCALCITONIN (PCT): CPT | Performed by: STUDENT IN AN ORGANIZED HEALTH CARE EDUCATION/TRAINING PROGRAM

## 2022-01-04 PROCEDURE — 84132 ASSAY OF SERUM POTASSIUM: CPT | Performed by: NURSE PRACTITIONER

## 2022-01-04 PROCEDURE — 99239 HOSP IP/OBS DSCHRG MGMT >30: CPT | Performed by: NURSE PRACTITIONER

## 2022-01-04 PROCEDURE — 85025 COMPLETE CBC W/AUTO DIFF WBC: CPT | Performed by: STUDENT IN AN ORGANIZED HEALTH CARE EDUCATION/TRAINING PROGRAM

## 2022-01-04 PROCEDURE — RECHECK: Performed by: NURSE PRACTITIONER

## 2022-01-04 RX ORDER — QUETIAPINE FUMARATE 25 MG/1
25 TABLET, FILM COATED ORAL
Status: DISCONTINUED | OUTPATIENT
Start: 2022-01-04 | End: 2022-01-05 | Stop reason: HOSPADM

## 2022-01-04 RX ORDER — HALOPERIDOL 5 MG/ML
2 INJECTION INTRAMUSCULAR ONCE
Status: DISCONTINUED | OUTPATIENT
Start: 2022-01-04 | End: 2022-01-05 | Stop reason: HOSPADM

## 2022-01-04 RX ORDER — MAGNESIUM SULFATE 1 G/100ML
1 INJECTION INTRAVENOUS ONCE
Status: COMPLETED | OUTPATIENT
Start: 2022-01-04 | End: 2022-01-04

## 2022-01-04 RX ORDER — HYDROXYZINE HYDROCHLORIDE 25 MG/1
25 TABLET, FILM COATED ORAL EVERY 6 HOURS PRN
Status: DISCONTINUED | OUTPATIENT
Start: 2022-01-04 | End: 2022-01-05 | Stop reason: HOSPADM

## 2022-01-04 RX ORDER — SENNOSIDES 8.6 MG
8.6 TABLET ORAL DAILY
Qty: 30 TABLET | Refills: 0 | Status: SHIPPED | OUTPATIENT
Start: 2022-01-05

## 2022-01-04 RX ORDER — POTASSIUM CHLORIDE 14.9 MG/ML
20 INJECTION INTRAVENOUS
Status: DISPENSED | OUTPATIENT
Start: 2022-01-04 | End: 2022-01-04

## 2022-01-04 RX ORDER — DOCUSATE SODIUM 100 MG/1
100 CAPSULE, LIQUID FILLED ORAL 2 TIMES DAILY
Qty: 30 CAPSULE | Refills: 0 | Status: SHIPPED | OUTPATIENT
Start: 2022-01-04

## 2022-01-04 RX ORDER — POTASSIUM CHLORIDE 29.8 MG/ML
40 INJECTION INTRAVENOUS
Status: DISCONTINUED | OUTPATIENT
Start: 2022-01-04 | End: 2022-01-04

## 2022-01-04 RX ORDER — POTASSIUM CHLORIDE 20 MEQ/1
40 TABLET, EXTENDED RELEASE ORAL
Status: COMPLETED | OUTPATIENT
Start: 2022-01-04 | End: 2022-01-04

## 2022-01-04 RX ORDER — POTASSIUM CHLORIDE 20 MEQ/1
20 TABLET, EXTENDED RELEASE ORAL ONCE
Status: COMPLETED | OUTPATIENT
Start: 2022-01-04 | End: 2022-01-04

## 2022-01-04 RX ADMIN — STANDARDIZED SENNA CONCENTRATE 8.6 MG: 8.6 TABLET ORAL at 09:30

## 2022-01-04 RX ADMIN — AMLODIPINE BESYLATE 5 MG: 5 TABLET ORAL at 09:29

## 2022-01-04 RX ADMIN — SODIUM CHLORIDE, SODIUM LACTATE, POTASSIUM CHLORIDE, AND CALCIUM CHLORIDE 100 ML/HR: .6; .31; .03; .02 INJECTION, SOLUTION INTRAVENOUS at 00:40

## 2022-01-04 RX ADMIN — POTASSIUM CHLORIDE 40 MEQ: 1500 TABLET, EXTENDED RELEASE ORAL at 12:21

## 2022-01-04 RX ADMIN — POTASSIUM CHLORIDE 20 MEQ: 14.9 INJECTION, SOLUTION INTRAVENOUS at 14:07

## 2022-01-04 RX ADMIN — POTASSIUM CHLORIDE 40 MEQ: 1500 TABLET, EXTENDED RELEASE ORAL at 12:22

## 2022-01-04 RX ADMIN — DOCUSATE SODIUM 100 MG: 100 CAPSULE, LIQUID FILLED ORAL at 09:30

## 2022-01-04 RX ADMIN — HEPARIN SODIUM 5000 UNITS: 5000 INJECTION INTRAVENOUS; SUBCUTANEOUS at 06:33

## 2022-01-04 RX ADMIN — POTASSIUM CHLORIDE 20 MEQ: 1500 TABLET, EXTENDED RELEASE ORAL at 08:29

## 2022-01-04 RX ADMIN — HEPARIN SODIUM 5000 UNITS: 5000 INJECTION INTRAVENOUS; SUBCUTANEOUS at 13:21

## 2022-01-04 RX ADMIN — MAGNESIUM SULFATE HEPTAHYDRATE 1 G: 1 INJECTION, SOLUTION INTRAVENOUS at 09:28

## 2022-01-04 RX ADMIN — POTASSIUM CHLORIDE 20 MEQ: 14.9 INJECTION, SOLUTION INTRAVENOUS at 12:29

## 2022-01-04 NOTE — ASSESSMENT & PLAN NOTE
· Likely from hypovolemia/dehydration vs occult arrhythmia from hypokalemia   · Continue IV fluids  · CT head negative for acute pathology   · Continue telemetry  · PT OT recommending home with home healthcare

## 2022-01-04 NOTE — ASSESSMENT & PLAN NOTE
· Likely from hypovolemia/dehydration vs occult arrhythmia from hypokalemia   · Follow-up with PCP in 2-3 weeks  · CT head negative for acute pathology   · VERONICA/hypovolemia resolved with IV hydration  · Hypokalemia resolved with aggressive supplementation  · PT OT recommending home with home healthcare  · Patient ambulating in room without difficulty

## 2022-01-04 NOTE — ASSESSMENT & PLAN NOTE
· Likely in setting of GI losses from emesis  · Improved from 2 5 to 2 7   · Continue aggressive repletion  · Continue telemetry

## 2022-01-04 NOTE — ASSESSMENT & PLAN NOTE
· Likely from hypovolemia/dehydration vs occult arrhythmia from hypokalemia   · Start IV fluids  · CT head negative for acute pathology   · Continue telemetry, monitor   · Consult PT and OT

## 2022-01-04 NOTE — ASSESSMENT & PLAN NOTE
· CT imaging demonstrating massive distension of colon consistent with constipation  · Monitor stool output  · Start bowel regimen

## 2022-01-04 NOTE — ASSESSMENT & PLAN NOTE
· CT imaging demonstrating massive distension of colon consistent with constipation  · Significant bowel movement this morning  · Continue senna Colace encouraged ambulation at home discussed with family

## 2022-01-04 NOTE — H&P
[address] [facility]  H&P- [facility] 1940, 80 y o  female MRN: [id number]  Unit/Bed#: ED 19 Encounter: [id number]  Primary Care Provider: Shweta August DO   Date and time admitted to hospital: 1/3/2022  5:52 PM    * Hypokalemia  Assessment & Plan  · Likely in setting of GI losses from emesis   · Continue aggressive repletion  · Start telemetry     Syncope  Assessment & Plan  · Likely from hypovolemia/dehydration vs occult arrhythmia from hypokalemia   · Start IV fluids  · CT head negative for acute pathology   · Continue telemetry, monitor   · Consult PT and OT     Pneumonia due to COVID-19 virus  Assessment & Plan  · Non hypoxic  · Check d dimer, procalcitonin  · Hold off on medications   · Monitor respiratory status       Acute kidney injury ([facility] [address] )  Assessment & Plan  · Likely in setting of dehydration from GI losses  · Start IV fluids, monitor creatinine    Hypertension  Assessment & Plan  · Could be better controlled  · Continue amlodipine   · Add hydralazine     Elevated troponin  Assessment & Plan  · Non chest pain associated elevation in troponin secondary to electrolyte abnormalities, constipation, VERONICA  · Trend to peak for now     Constipation  Assessment & Plan  · CT imaging demonstrating massive distension of colon consistent with constipation  · Monitor stool output  · Start bowel regimen    VTE Pharmacologic Prophylaxis: VTE Score: 4 Moderate Risk (Score 3-4) - Pharmacological DVT Prophylaxis Ordered: heparin  Code Status: Level 1 - Full Code   Discussion with family: Updated  (son) at bedside  Anticipated Length of Stay: Patient will be admitted on an inpatient basis with an anticipated length of stay of greater than 2 midnights secondary to hypokalemia  Total Time for Visit, including Counseling / Coordination of Care: 30 minutes Greater than 50% of this total time spent on direct patient counseling and coordination of care      Chief Complaint: syncope    History of Present Illness:  Sharda Pritchard is a 80 y o  female with a PMH of htn who presents with syncope  Syncopal episode witnessed by patients family  No reported head trauma  Came to the ED for further evaluation, was noted to have hypokalemia and severe constipation and admitted to medicine  Review of Systems:  Review of Systems   Constitutional: Positive for activity change and appetite change  Negative for chills and fever  HENT: Negative for ear pain and sore throat  Eyes: Negative for pain and visual disturbance  Respiratory: Negative for cough and shortness of breath  Cardiovascular: Negative for chest pain and palpitations  Gastrointestinal: Positive for constipation, nausea and vomiting  Negative for abdominal pain  Genitourinary: Negative for dysuria and hematuria  Musculoskeletal: Negative for arthralgias and back pain  Skin: Negative for color change and rash  Neurological: Positive for syncope  Negative for seizures  All other systems reviewed and are negative  Past Medical and Surgical History:   Past Medical History:   Diagnosis Date    Dementia ([facility] [address] )     Hypertension        No past surgical history on file  Meds/Allergies:  Prior to Admission medications    Medication Sig Start Date End Date Taking? Authorizing Provider   amLODIPine (NORVASC) 5 mg tablet Take 1 tablet (5 mg total) by mouth daily 10/8/21   KAREN Garcia   lidocaine (LIDODERM) 5 % APPLY 1 PATCH TOPICALLY DAILY REMOVE & DISCARD PATCH WITHIN 12 HOURS OR AS DIRECTED BY MD 9/13/21 10/13/21  Gladis Elena DO   lisinopril (ZESTRIL) 20 mg tablet TAKE 1 TABLET BY MOUTH EVERY DAY 1/3/22   Gladis Elena DO   lisinopril (ZESTRIL) 20 mg tablet Take 1 tablet (20 mg total) by mouth daily 10/8/21 1/3/22  KAREN Garcia     I have reviewed home medications with patient personally      Allergies: No Known Allergies    Social History:  Marital Status:    Occupation: na  Patient Pre-hospital Living Situation: Home  Patient Pre-hospital Level of Mobility: walks  Patient Pre-hospital Diet Restrictions: regular   Substance Use History:   Social History     Substance and Sexual Activity   Alcohol Use Never     Social History     Tobacco Use   Smoking Status Never Smoker   Smokeless Tobacco Never Used     Social History     Substance and Sexual Activity   Drug Use Never       Family History:  Family History   Problem Relation Age of Onset    No Known Problems Mother     No Known Problems Father        Physical Exam:     Vitals:   Blood Pressure: 162/75 (01/03/22 1801)  Pulse: 89 (01/03/22 1801)  Temperature: 99 1 °F (37 3 °C) (01/03/22 1801)  Temp Source: Oral (01/03/22 1801)  Respirations: (!) 23 (01/03/22 1801)  Height: 5' 3" (160 cm) (01/03/22 1801)  Weight - Scale: 54 7 kg (120 lb 9 5 oz) (01/03/22 1801)  SpO2: 97 % (01/03/22 1801)    Physical Exam  Vitals and nursing note reviewed  Constitutional:       Appearance: Normal appearance  HENT:      Head: Normocephalic and atraumatic  Right Ear: External ear normal       Left Ear: External ear normal       Nose: No congestion or rhinorrhea  Mouth/Throat:      Mouth: Mucous membranes are dry  Pharynx: Oropharynx is clear  Eyes:      General:         Right eye: No discharge  Left eye: No discharge  Cardiovascular:      Rate and Rhythm: Normal rate and regular rhythm  Pulmonary:      Effort: Pulmonary effort is normal  No respiratory distress  Abdominal:      General: Abdomen is flat  Palpations: Abdomen is soft  Musculoskeletal:      Cervical back: Normal range of motion and neck supple  Right lower leg: No edema  Left lower leg: No edema  Skin:     General: Skin is warm and dry  Neurological:      General: No focal deficit present  Mental Status: She is alert  Mental status is at baseline     Psychiatric:         Mood and Affect: Mood normal          Behavior: Behavior normal           Additional Data:     Lab Results:  Results from last 7 days   Lab Units 01/03/22  1905   WBC Thousand/uL 6 57   HEMOGLOBIN g/dL 11 1*   HEMATOCRIT % 34 6*   PLATELETS Thousands/uL 254   NEUTROS PCT % 79*   LYMPHS PCT % 11*   MONOS PCT % 8   EOS PCT % 1     Results from last 7 days   Lab Units 01/03/22  1838   SODIUM mmol/L 146*   POTASSIUM mmol/L 2 5*   CHLORIDE mmol/L 104   CO2 mmol/L 30   BUN mg/dL 15   CREATININE mg/dL 1 35*   ANION GAP mmol/L 12   CALCIUM mg/dL 8 9   ALBUMIN g/dL 3 7   TOTAL BILIRUBIN mg/dL 0 48   ALK PHOS U/L 55   ALT U/L 25   AST U/L 26   GLUCOSE RANDOM mg/dL 123                 Results from last 7 days   Lab Units 01/03/22  1838   LACTIC ACID mmol/L 1 1       Imaging: Reviewed radiology reports from this admission including: abdominal/pelvic CT and CT head  CT abdomen pelvis wo contrast   Final Result by Jodi Jenkins MD (01/03 2133)      Massive distention of the colon due to constipation with large amount of fecal material in the proximal sigmoid colon which is markedly redundant  Findings have worsened compared to prior study  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  Workstation performed: [id number]         XR chest 1 view portable   ED Interpretation by Judy Moralez MD (01/03 1918)   NAD          EKG and Other Studies Reviewed on Admission:   · EKG: Pending scan into epic chart  ** Please Note: This note has been constructed using a voice recognition system   **

## 2022-01-04 NOTE — NURSING NOTE
Pt ws transferred from ED 19 to Endo 4   Son was at bedside due to Covid precautions son left  Pt is very confuded and pulled out her IV with Potassium infusing  Megan Newell, NP was informed and ordered posey restraints and Haldol IM   Pt potassium resulting at 3 7 and updated Justina and order for discharge placed  Son was notified and awaiting for arrival to take Pt home  Discharge instructions printed and will be endorsed to nurse [facility] on oncoming shift

## 2022-01-04 NOTE — DISCHARGE INSTRUCTIONS
Acute Kidney Injury   WHAT YOU NEED TO KNOW:   Acute kidney injury (VERONICA) is also called acute kidney failure, or acute renal failure  VERONICA happens when your kidneys suddenly stop working correctly  Normally, the kidneys remove fluid, chemicals, and waste from your blood  These wastes are turned into urine by your kidneys  VERONICA usually happens over hours or days  When you have VERONICA, your kidneys do not remove the waste, chemicals, or extra fluid from your body  A normal amount of urine is not produced  VERONICA is usually temporary, it can take days to months to recover  VERONICA can also become a chronic kidney condition  DISCHARGE INSTRUCTIONS:   Call 911 if:   · You have sudden chest pain or trouble breathing  Seek care immediately if:   · Your symptoms get worse  Contact your healthcare provider if:   · Your symptoms return  · Your blood sugar or blood pressure level is not within the range your healthcare provider recommends  · You have questions or concerns about your condition or care  Nutrition:  Your healthcare provider may tell you to eat food low in sodium (salt), potassium, phosphorus, or protein  A dietitian can help you plan your meals  Drink liquids as directed: Your healthcare provider may recommend that you drink a certain amount of liquids  This will help your kidneys work better and decrease your risk for dehydration  Ask how much liquid to drink each day and which liquids are best for you  Prevent acute kidney injury:   · Manage other health conditions  such as diabetes, high blood pressure, or heart disease  These conditions increase your risk for acute kidney injury  Take your medicines for these conditions as directed  Also, monitor your blood sugar and blood pressure levels as directed  Contact your healthcare provider if your levels are not in the range he or she says it should be  · Talk to your healthcare provider before you take over-the-counter-medicine    NSAIDs, stomach medicine, or laxatives may harm your kidneys and increase your risk for acute kidney injury  If it is okay to take the medicine, follow the directions on the package  Do not take more than directed  · Tell healthcare providers you have had acute kidney injury  before you get contrast liquid for an x-ray or CT scan  Your healthcare provider may give you medicine to prevent kidney problems caused by the liquid  Follow up with your healthcare provider as directed: You will need to return for more tests to make sure your kidneys are working properly  You may also be referred to a kidney specialist  Write down your questions so you remember to ask them during your visits  © Copyright [employer] 2021 Information is for End User's use only and may not be sold, redistributed or otherwise used for commercial purposes  All illustrations and images included in CareNotes® are the copyrighted property of [employer]  or [employer]  The above information is an  only  It is not intended as medical advice for individual conditions or treatments  Talk to your doctor, nurse or pharmacist before following any medical regimen to see if it is safe and effective for you

## 2022-01-04 NOTE — ASSESSMENT & PLAN NOTE
· Non hypoxic  · Check d dimer, procalcitonin  · Hold off on medications   · Monitor respiratory status

## 2022-01-04 NOTE — ASSESSMENT & PLAN NOTE
· Likely in setting of GI losses from emesis  · Improved from 2 5/ 2 7/3 7  · Improved aggressive repletion  · Encouraged p o   Intake

## 2022-01-04 NOTE — PROGRESS NOTES
[facility] [facility]  Progress Note Tatum Yu 1940, 80 y o  female MRN: [id number]  Unit/Bed#: ED 19 Encounter: [id number]  Primary Care Provider: Turner Cuevas DO   Date and time admitted to hospital: 1/3/2022  5:52 PM    * Hypokalemia  Assessment & Plan  · Likely in setting of GI losses from emesis  · Improved from 2 5 to 2 7   · Continue aggressive repletion  · Continue telemetry     Elevated troponin  Assessment & Plan  · Non chest pain associated elevation in troponin secondary to electrolyte abnormalities, constipation, VERONICA  · HS troponin 24/62/59    Syncope  Assessment & Plan  · Likely from hypovolemia/dehydration vs occult arrhythmia from hypokalemia   · Continue IV fluids  · CT head negative for acute pathology   · Continue telemetry  · PT OT recommending home with home healthcare    Pneumonia due to COVID-19 virus  Assessment & Plan  · Non hypoxic  · Procalcitonin negative  · Supportive care  · Monitor respiratory status       Hypertension  Assessment & Plan  · Continue amlodipine   · P r n  hydralazine     Constipation  Assessment & Plan  · CT imaging demonstrating massive distension of colon consistent with constipation  · Monitor stool output  · Start bowel regimen    Acute kidney injury (HCC)-resolved as of 1/4/2022  Assessment & Plan  · Resolved with IV fluids      VTE Pharmacologic Prophylaxis: VTE Score: 4 Moderate Risk (Score 3-4) - Pharmacological DVT Prophylaxis Ordered: heparin  Patient Centered Rounds: I performed bedside rounds with nursing staff today  Discussions with Specialists or Other Care Team Provider:  Reviewed notes    Education and Discussions with Family / Patient:  Discussed with patient; son at bedside    Time Spent for Care: 20 minutes  More than 50% of total time spent on counseling and coordination of care as described above      Current Length of Stay: 1 day(s)  Current Patient Status: Inpatient   Certification Statement: The patient will continue to require additional inpatient hospital stay due to Monitoring of constipation, monitoring nausea vomiting, monitoring of hypokalemia  Discharge Plan: Anticipate discharge tomorrow to home  Code Status: Level 1 - Full Code    Subjective:   Patient and son reports that she had a large bowel movement this morning denies any nausea vomiting abdominal pain or discomfort  Resting comfortably in stretcher no chest pain chest tightness shortness of breath or difficulty breathing appears to be in no distress at all  Explained patient's low potassium Will or doing to corrected verbalizes understanding    Objective:     Vitals:   Temp (24hrs), Av 1 °F (37 3 °C), Min:99 1 °F (37 3 °C), Max:99 1 °F (37 3 °C)    Temp:  [99 1 °F (37 3 °C)] 99 1 °F (37 3 °C)  HR:  [89] 89  Resp:  [23] 23  BP: (162)/(75) 162/75  SpO2:  [97 %] 97 %  Body mass index is 21 36 kg/m²  Input and Output Summary (last 24 hours):   No intake or output data in the 24 hours ending 22 1339    Physical Exam:   Physical Exam  Vitals and nursing note reviewed  Exam conducted with a chaperone present  Constitutional:       General: She is not in acute distress  Appearance: She is well-developed  HENT:      Head: Normocephalic and atraumatic  Eyes:      Conjunctiva/sclera: Conjunctivae normal    Cardiovascular:      Rate and Rhythm: Normal rate and regular rhythm  Pulmonary:      Effort: Pulmonary effort is normal  No respiratory distress  Musculoskeletal:      Cervical back: Neck supple  Skin:     General: Skin is warm and dry  Neurological:      General: No focal deficit present  Mental Status: She is alert  Mental status is at baseline  Psychiatric:         Mood and Affect: Mood normal          Thought Content:  Thought content normal          Judgment: Judgment normal         Additional Data:     Labs:  Results from last 7 days   Lab Units 22  0447   WBC Thousand/uL 3 63*   HEMOGLOBIN g/dL 10 4* HEMATOCRIT % 32 4*   PLATELETS Thousands/uL 234   NEUTROS PCT % 61   LYMPHS PCT % 27   MONOS PCT % 11   EOS PCT % 0     Results from last 7 days   Lab Units 01/04/22  0447   SODIUM mmol/L 147*   POTASSIUM mmol/L 2 7*   CHLORIDE mmol/L 109*   CO2 mmol/L 29   BUN mg/dL 10   CREATININE mg/dL 1 03   ANION GAP mmol/L 9   CALCIUM mg/dL 8 0*   ALBUMIN g/dL 2 9*   TOTAL BILIRUBIN mg/dL 0 41   ALK PHOS U/L 44*   ALT U/L 21   AST U/L 24   GLUCOSE RANDOM mg/dL 97                 Results from last 7 days   Lab Units 01/04/22  0447 01/03/22  1838   LACTIC ACID mmol/L  --  1 1   PROCALCITONIN ng/ml 0 08  --        Lines/Drains:  Invasive Devices  Report    Peripheral Intravenous Line            Peripheral IV 01/03/22 Left Antecubital <1 day                  Telemetry:  Telemetry Orders (From admission, onward)             24 Hour Telemetry Monitoring  Continuous x 24 Hours (Telem)        References:    Telemetry Guidelines   Question:  Reason for 24 Hour Telemetry  Answer:  Metabolic/Electrolyte Disturbance with High Probability of Dysrhythmia (K level <3 or >6, or KCL infusion >=10mEq/hr)                      Recent Cultures (last 7 days):         Last 24 Hours Medication List:   Current Facility-Administered Medications   Medication Dose Route Frequency Provider Last Rate    acetaminophen  650 mg Oral Q6H PRN Nixon Garcia MD      amLODIPine  5 mg Oral Daily Nixon Garcia MD      calcium carbonate  1,000 mg Oral Daily PRN Nixon Garcia MD      docusate sodium  100 mg Oral BID Nixon Garcia MD      heparin (porcine)  5,000 Units Subcutaneous Q8H [facility] Nixon Garcia MD      hydrALAZINE  5 mg Intravenous Q6H PRN Nixon Garcia MD      lactated ringers  100 mL/hr Intravenous Continuous Nixon Garcia  mL/hr (01/04/22 0040)    magnesium hydroxide  30 mL Oral Daily PRN Nixon Garcia MD      ondansetron  4 mg Intravenous Q6H PRN Nixon Garcia MD      potassium chloride  40 mEq Oral Once Nixon Garcia MD      potassium chloride 20 mEq Intravenous Q2H KAREN Arrieta 20 mEq (01/04/22 1229)    senna  1 tablet Oral Daily Eliseo Lopez MD      sodium chloride  125 mL/hr Intravenous Continuous Miriam Madrid MD Stopped (01/04/22 8859)        Today, Patient Was Seen By: KAREN Arrieta    **Please Note: This note may have been constructed using a voice recognition system  **

## 2022-01-04 NOTE — DISCHARGE INSTR - AVS FIRST PAGE
Dear César Wilkinson,     It was our pleasure to care for you here at [facility], [facility]  It is our hope that we were always able to exceed the expected standards for your care during your stay  You were hospitalized due to hypokalemia  You were cared for on the *** floor by KAREN Israel under the service of Tierra Fuller MD with the [facility] who covers for your primary care physician (PCP), Turner Cuevas DO, while you were hospitalized  If you have any questions or concerns related to this hospitalization, you may contact us at [phone]  For follow up as well as any medication refills, we recommend that you follow up with your primary care physician  A registered nurse will reach out to you by phone within a few days after your discharge to answer any additional questions that you may have after going home  However, at this time we provide for you here, the most important instructions / recommendations at discharge:     Notable Medication Adjustments -   None  Testing Required after Discharge -   BMP in 1-2 weeks  Important follow up information -   Continue to increase fluid intake with electrolyte drinks such as Gatorade/smart water  Other Instructions -   Follow-up with PCP in 2-3 weeks  Please review this entire after visit summary as additional general instructions including medication list, appointments, activity, diet, any pertinent wound care, and other additional recommendations from your care team that may be provided for you        Sincerely,     Eirka Israel

## 2022-01-04 NOTE — ASSESSMENT & PLAN NOTE
· Non chest pain associated elevation in troponin secondary to electrolyte abnormalities, constipation, VERONICA  · HS troponin 24/62/59

## 2022-01-04 NOTE — DISCHARGE SUMMARY
[facility]  Discharge- Amor Henningn 1940, 80 y o  female MRN: [id number]  Unit/Bed#: ENDO 04-01 Encounter: [id number]  Primary Care Provider: Chery Romo DO   Date and time admitted to hospital: 1/3/2022  5:52 PM    * Hypokalemia  Assessment & Plan  · Likely in setting of GI losses from emesis  · Improved from 2 5/ 2 7/3 7  · Improved aggressive repletion  · Encouraged p o   Intake    Syncope  Assessment & Plan  · Likely from hypovolemia/dehydration vs occult arrhythmia from hypokalemia   · Follow-up with PCP in 2-3 weeks  · CT head negative for acute pathology   · VERONICA/hypovolemia resolved with IV hydration  · Hypokalemia resolved with aggressive supplementation  · PT OT recommending home with home healthcare    Pneumonia due to COVID-19 virus  Assessment & Plan  · Non hypoxic  · Procalcitonin negative  · Supportive care  · 100% on room air      Hypertension  Assessment & Plan  · Continue amlodipine   · P r n  hydralazine     Constipation  Assessment & Plan  · CT imaging demonstrating massive distension of colon consistent with constipation  · Significant bowel movement this morning  · Continue senna Colace encouraged ambulation at home discussed with family      Medical Problems             Resolved Problems  Date Reviewed: 10/8/2021          Resolved    Acute kidney injury ([address] ) 1/4/2022     Resolved by  Sienna Rivas              Discharging Physician / Practitioner: Sienna Rivas  PCP: Chery Romo DO  Admission Date:   Admission Orders (From admission, onward)     Ordered        01/03/22 2157  Inpatient Admission  Once                      Discharge Date: 01/04/22    Consultations During Hospital Stay:  ·  None      Procedures Performed:   · Image    Significant Findings / Test Results:      CT abdomen pelvis wo contrast   Final Result by Jennifer Godoy MD (01/03 2133)      Massive distention of the colon due to constipation with large amount of fecal material in the proximal sigmoid colon which is markedly redundant  Findings have worsened compared to prior study  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  Workstation performed: [id number]         XR chest 1 view portable   ED Interpretation by Melissa Henning MD (01/03 1918)   NAD      Final Result by Laura Dave MD (01/04 7118)      No acute cardiopulmonary disease  Workstation performed: [id number]         ·   ·     Incidental Findings:   · None     Test Results Pending at Discharge (will require follow up): · None     Outpatient Tests Requested:  · BMP    Complications:  None    Reason for Admission:    Chief Complaint   Patient presents with    Syncope     Pt with syncopal event and vomiting, witnessed by family  Was sitting at table, did not fall out of chair  No recent injuries  Hospital Course:   Sharda Pritchard is a 80 y o  female patient who originally presented to the hospital on 1/3/2022 due to syncope which was likely secondary to patient's dehydration and hypokalemia  Patient had decreased p o  Intake in the last several days which could be related to her COVID 19 diagnosis  After aggressive electrolyte supplementation and IV fluids her VERONICA resolved, her hypokalemia resolved  No further events of lightheadedness dizziness or syncope  Patient did have some sundowning later today which is typical per the family  I informed them that her sundowning might be slightly worse today and tomorrow but she continued to return to baseline once her routine is resumed verbalized understanding denies any additional questions or concerns at this time    Please see above list of diagnoses and related plan for additional information  Condition at Discharge: stable    Discharge Day Visit / Exam:   Subjective:  Resting comfortably    Moderately agitated and fidgeting related to her dementia and sundowning which is the patient's baseline per discussion with patient's son and daughter  Vitals: Blood Pressure: 162/75 (01/03/22 1801)  Pulse: 101 (01/04/22 1549)  Temperature: 97 7 °F (36 5 °C) (01/04/22 1549)  Temp Source: Oral (01/03/22 1801)  Respirations: 22 (01/04/22 1549)  Height: 5' 3" (160 cm) (01/03/22 1801)  Weight - Scale: 54 7 kg (120 lb 9 5 oz) (01/03/22 1801)  SpO2: 100 % (01/04/22 1549)  Exam:   Physical Exam  Vitals and nursing note reviewed  Constitutional:       General: She is not in acute distress  Cardiovascular:      Rate and Rhythm: Normal rate  Pulmonary:      Effort: Pulmonary effort is normal  No respiratory distress  Abdominal:      Palpations: Abdomen is soft  Musculoskeletal:         General: Normal range of motion  Skin:     General: Skin is warm  Neurological:      Mental Status: She is alert  Mental status is at baseline  Psychiatric:         Mood and Affect: Mood normal         Discussion with Family:  Discussed with son and daughter agreeable to plan    Discharge instructions/Information to patient and family:   See after visit summary for information provided to patient and family  Provisions for Follow-Up Care:  See after visit summary for information related to follow-up care and any pertinent home health orders  Disposition:   Home    Planned Readmission:  No     Discharge Statement:  I spent 45 minutes discharging the patient  This time was spent on the day of discharge  I had direct contact with the patient on the day of discharge  Greater than 50% of the total time was spent examining patient, answering all patient questions, arranging and discussing plan of care with patient as well as directly providing post-discharge instructions  Additional time then spent on discharge activities  Discharge Medications:  See after visit summary for reconciled discharge medications provided to patient and/or family        **Please Note: This note may have been constructed using a voice recognition system**

## 2022-01-04 NOTE — OCCUPATIONAL THERAPY NOTE
Occupational Therapy Evaluation        Patient Name: Dane Barker  [id number] Date: 1/4/2022 01/04/22 0903   OT Last Visit   OT Visit Date 01/04/22   Note Type   Note type Evaluation   Restrictions/Precautions   Weight Bearing Precautions Per Order No   Other Precautions Cognitive;Multiple lines;Telemetry;O2;Fall Risk   Pain Assessment   Pain Assessment Tool 0-10   Pain Score No Pain   Home Living   Type of [address] Two level;Stairs to enter with rails; Other (Comment); Performs ADLs on one level  (5 + 5 MARIELLE/ 1 flight to second floor bedroom)   Bathroom Shower/Tub Tub/shower unit   Bathroom Toilet Standard   Bathroom Equipment Other (Comment)  (None at baseline)   [address] Other (Comment)  (none at baseline)   Additional Comments ambulatory with no AD   Prior Function   Level of [place] Independent with ADLs and functional mobility   Lives With [employer] Help From Family  (Son is the primary caregiver)   ADL Assistance Independent   IADLs Needs assistance   Falls in the last 6 months 0   Lifestyle   Autonomy Patient and Son reported independent with ADLs,  and functional mobility  Patient ambulates independently, manages stairs and all ADLs  Son reports the need is for cognitive assistance/ supervision and IADLs  Patient lives with Son and  other family members also with dementia  Son is the primary caregiver, assists with transportation and medical care     Reciprocal Relationships Supportive Family   Psychosocial   Psychosocial (WDL) WDL   ADL   Eating Assistance 7  Independent   Grooming Assistance 6  Modified Independent   UB Bathing Assistance 6  Modified Independent   LB Bathing Assistance 5  Supervision/Setup   UB Dressing Assistance 6  Modified independent   LB Dressing Assistance 5  Supervision/Setup   Toileting Assistance  5  Supervision/Setup   Functional Assistance 5  Supervision/Setup   Bed Mobility   Supine to Sit 5 Supervision   Sit to Supine 5  Supervision   Transfers   Sit to Stand 4  Minimal assistance   Additional items Other;Assist x 1  (close contact guard)   Stand to Sit 4  Minimal assistance   Additional items Other;Assist x 1  (close contact guard)   Functional Mobility   Functional Mobility 4  Minimal assistance   Additional items Hand hold assistance   Balance   Static Sitting Good   Dynamic Sitting Fair +   Static Standing Fair +   Dynamic Standing Fair +   Activity Tolerance   Activity Tolerance Patient tolerated treatment well   Nurse Made Aware RN present in room   RUE Assessment   RUE Assessment WFL   LUE Assessment   LUE Assessment WFL   Hand Function   Gross Motor Coordination Functional   Fine Motor Coordination Functional   Sensation   Light Touch No apparent deficits  (BUEs)   Vision-Basic Assessment   Current Vision Wears glasses all the time  (glasses not present bedside)   Cognition   Overall Cognitive Status Impaired   Arousal/Participation Alert; Responsive; Cooperative   Attention Difficulty attending to directions   Orientation Level Oriented to person;Disoriented to place; Disoriented to time;Disoriented to situation   Memory Decreased recall of biographical information;Decreased short term memory;Decreased recall of recent events   Following Commands Follows one step commands with increased time or repetition   Assessment   Limitation Decreased ADL status; Decreased Safe judgement during ADL;Decreased endurance;Decreased self-care trans   Prognosis Good   Assessment Patient is a 80 y o  female seen for OT evaluation s/p admit to [address] [facility] on 1/3/2022 w/Hypokalemia  Commorbidities affecting patient's functional performance at time of assessment include: hypokalemia, syncope, Pneumonia due to Covid 19, acute kidney injury, hypertension, elevated troponin, constipation, presented to ED with syncope  Orders placed for OT evaluation and treatment    Performed at least two patient identifiers during session including name and wristband  Prior to admission, Patient and Son reported independent with ADLs,  and functional mobility  Patient ambulates independently, manages stairs and all ADLs  Son reports the need is for cognitive assistance/ supervision and IADLs  Patient lives with Son and  other family members also with dementia  Son is the primary caregiver, assists with transportation and medical care  Personal factors affecting patient at time of initial evaluation include: steps to enter, decreased initiation and engagement and difficulty performing ADLs  Upon evaluation, patient requires supervision and set up assist for UB ADLs, supervision and set up assist for LB ADLs, transfers and functional ambulation in room and bathroom with supervision, set up and contact guard assist, with hand hold assist   Occupational performance is affected by the following deficits: orientation, dynamic sit/ stand balance deficit with poor standing tolerance time for self care and functional mobility, decreased activity tolerance, impaired memory, impaired problem solving and decreased safety awareness  Patient to benefit from continued Occupational Therapy treatment while in the hospital to address deficits as defined above and maximize level of functional independence with ADLs and functional mobility  Occupational Performance areas to address include: bathing/ shower, dressing, transfer to all surfaces, functional ambulation, functional mobility and IADLs: safety procedures  From OT standpoint, recommendation at time of d/c would be Home with family support, [address] and Home OT  Plan   Treatment Interventions ADL retraining;Functional transfer training; Endurance training;Patient/family training;Equipment evaluation/education; Compensatory technique education; Energy conservation; Activityengagement   Goal Expiration Date 01/18/22   OT Frequency 2-3x/wk   Recommendation   OT Discharge Recommendation Home with home health rehabilitation   AM-PAC Daily Activity Inpatient   Lower Body Dressing 3   Bathing 3   Toileting 3   Upper Body Dressing 3   Grooming 3   Eating 4   Daily Activity Raw Score 19   Daily Activity Standardized Score (Calc for Raw Score >=11) 40 22   AM-PAC Applied Cognition Inpatient   Following a Speech/Presentation 2   Understanding Ordinary Conversation 3   Taking Medications 1   Remembering Where Things Are Placed or Put Away 1   Remembering List of 4-5 Errands 1   Taking Care of Complicated Tasks 1   Applied Cognition Raw Score 9   Applied Cognition Standardized Score 22 48   Barthel Index   Feeding 10   Bathing 0   Grooming Score 0   Dressing Score 5   Bladder Score 10   Bowels Score 10   Toilet Use Score 5   Transfers (Bed/Chair) Score 10   Mobility (Level Surface) Score 0   Stairs Score 0   Barthel Index Score 50       Occupational Therapy goals: In 7-14 days:     1- Patient will verbalize and demonstrate use of energy conservation/ deep breathing technique and work simplification skills during functional activity with no verbal cues  2-Patient will verbalize and demonstrate good body mechanics and joint protection techniques during  ADLs/ IADLs with no verbal cues  3- Patient will increase OOB/ sitting tolerance to 2-4 hours per day for increased participation in self care and leisure tasks with no s/s of exertion  4-Patient will increase standing tolerance time to 5  minutes with unilateral UE support to complete sink level ADLs@ mod I level   5- Patient will increase sitting tolerance at edge of bed to 20 minutes to complete UB ADLs @ set up assist level  6- Patient will transfer bed to Chair / toilet at Set up assist level with AD as indicated  7- Patient will complete UB ADLs with set up assist  8- Patient will complete LB ADLs with min assist with the use of adaptive equipment    9- Patient will complete toileting hygiene with set up assist/ supervision for thoroughness    10-Patient/ Family  will demonstrate competency with UE Home Exercise Program

## 2022-01-04 NOTE — ASSESSMENT & PLAN NOTE
· Non chest pain associated elevation in troponin secondary to electrolyte abnormalities, constipation, VERONICA  · Trend to peak for now

## 2022-01-04 NOTE — PLAN OF CARE
Problem: OCCUPATIONAL THERAPY ADULT  Goal: Performs self-care activities at highest level of function for planned discharge setting  See evaluation for individualized goals  Description: Treatment Interventions: ADL retraining,Functional transfer training,Endurance training,Patient/family training,Equipment evaluation/education,Compensatory technique education,Energy conservation,Activityengagement          See flowsheet documentation for full assessment, interventions and recommendations  Note: Limitation: Decreased ADL status,Decreased Safe judgement during ADL,Decreased endurance,Decreased self-care trans  Prognosis: Good  Assessment: Patient is a 80 y o  female seen for OT evaluation s/p admit to [address] [facility] on 1/3/2022 w/Hypokalemia  Commorbidities affecting patient's functional performance at time of assessment include: hypokalemia, syncope, Pneumonia due to Covid 19, acute kidney injury, hypertension, elevated troponin, constipation, presented to ED with syncope  Orders placed for OT evaluation and treatment  Performed at least two patient identifiers during session including name and wristband  Prior to admission, Patient and Son reported independent with ADLs,  and functional mobility  Patient ambulates independently, manages stairs and all ADLs  Son reports the need is for cognitive assistance/ supervision and IADLs  Patient lives with Son and  other family members also with dementia  Son is the primary caregiver, assists with transportation and medical care  Personal factors affecting patient at time of initial evaluation include: steps to enter, decreased initiation and engagement and difficulty performing ADLs   Upon evaluation, patient requires supervision and set up assist for UB ADLs, supervision and set up assist for LB ADLs, transfers and functional ambulation in room and bathroom with supervision, set up and contact guard assist, with hand hold assist   Occupational performance is affected by the following deficits: orientation, dynamic sit/ stand balance deficit with poor standing tolerance time for self care and functional mobility, decreased activity tolerance, impaired memory, impaired problem solving and decreased safety awareness  Patient to benefit from continued Occupational Therapy treatment while in the hospital to address deficits as defined above and maximize level of functional independence with ADLs and functional mobility  Occupational Performance areas to address include: bathing/ shower, dressing, transfer to all surfaces, functional ambulation, functional mobility and IADLs: safety procedures  From OT standpoint, recommendation at time of d/c would be Home with family support, [address] and Home OT         OT Discharge Recommendation: Home with home health rehabilitation

## 2022-01-05 ENCOUNTER — TRANSITIONAL CARE MANAGEMENT (OUTPATIENT)
Dept: INTERNAL MEDICINE CLINIC | Facility: CLINIC | Age: 82
End: 2022-01-05

## 2022-01-05 NOTE — PLAN OF CARE
Problem: Potential for Falls  Goal: Patient will remain free of falls  Description: INTERVENTIONS:  - Educate patient/family on patient safety including physical limitations  - Instruct patient to call for assistance with activity   - Consult OT/PT to assist with strengthening/mobility   - Keep Call bell within reach  - Keep bed low and locked with side rails adjusted as appropriate  - Keep care items and personal belongings within reach  - Initiate and maintain comfort rounds  - Make Fall Risk Sign visible to staff  - Offer Toileting every  Hours, in advance of need  - Initiate/Maintain alarm  - Obtain necessary fall risk management equipment:   - Apply yellow socks and bracelet for high fall risk patients  - Consider moving patient to room near nurses station  Outcome: Adequate for Discharge     Problem: PAIN - ADULT  Goal: Verbalizes/displays adequate comfort level or baseline comfort level  Description: Interventions:  - Encourage patient to monitor pain and request assistance  - Assess pain using appropriate pain scale  - Administer analgesics based on type and severity of pain and evaluate response  - Implement non-pharmacological measures as appropriate and evaluate response  - Consider cultural and social influences on pain and pain management  - Notify physician/advanced practitioner if interventions unsuccessful or patient reports new pain  Outcome: Adequate for Discharge     Problem: INFECTION - ADULT  Goal: Absence or prevention of progression during hospitalization  Description: INTERVENTIONS:  - Assess and monitor for signs and symptoms of infection  - Monitor lab/diagnostic results  - Monitor all insertion sites, i e  indwelling lines, tubes, and drains  - Monitor endotracheal if appropriate and nasal secretions for changes in amount and color  - [place] appropriate cooling/warming therapies per order  - Administer medications as ordered  - Instruct and encourage patient and family to use good hand hygiene technique  - Identify and instruct in appropriate isolation precautions for identified infection/condition  Outcome: Adequate for Discharge  Goal: Absence of fever/infection during neutropenic period  Description: INTERVENTIONS:  - Monitor WBC    Outcome: Adequate for Discharge     Problem: SAFETY ADULT  Goal: Patient will remain free of falls  Description: INTERVENTIONS:  - Educate patient/family on patient safety including physical limitations  - Instruct patient to call for assistance with activity   - Consult OT/PT to assist with strengthening/mobility   - Keep Call bell within reach  - Keep bed low and locked with side rails adjusted as appropriate  - Keep care items and personal belongings within reach  - Initiate and maintain comfort rounds  - Make Fall Risk Sign visible to staff  - Offer Toileting every  Hours, in advance of need  - Initiate/Maintain alarm  - Obtain necessary fall risk management equipment:   - Apply yellow socks and bracelet for high fall risk patients  - Consider moving patient to room near nurses station  Outcome: Adequate for Discharge  Goal: Maintain or return to baseline ADL function  Description: INTERVENTIONS:  -  Assess patient's ability to carry out ADLs; assess patient's baseline for ADL function and identify physical deficits which impact ability to perform ADLs (bathing, care of mouth/teeth, toileting, grooming, dressing, etc )  - Assess/evaluate cause of self-care deficits   - Assess range of motion  - Assess patient's mobility; develop plan if impaired  - Assess patient's need for assistive devices and provide as appropriate  - Encourage maximum independence but intervene and supervise when necessary  - Involve family in performance of ADLs  - Assess for home care needs following discharge   - Consider OT consult to assist with ADL evaluation and planning for discharge  - Provide patient education as appropriate  Outcome: Adequate for Discharge  Goal: Maintains/Returns to pre admission functional level  Description: INTERVENTIONS:  - Perform BMAT or MOVE assessment daily    - Set and communicate daily mobility goal to care team and patient/family/caregiver  - Collaborate with rehabilitation services on mobility goals if consulted  - Perform Range of Motion  times a day  - Reposition patient every  hours  - Dangle patient  times a day  - Stand patient  times a day  - Ambulate patient  times a day  - Out of bed to chair  times a day   - Out of bed for meals times a day  - Out of bed for toileting  - Record patient progress and toleration of activity level   Outcome: Adequate for Discharge     Problem: DISCHARGE PLANNING  Goal: Discharge to home or other facility with appropriate resources  Description: INTERVENTIONS:  - Identify barriers to discharge w/patient and caregiver  - Arrange for needed discharge resources and transportation as appropriate  - Identify discharge learning needs (meds, wound care, etc )  - Arrange for interpretive services to assist at discharge as needed  - Refer to Case Management Department for coordinating discharge planning if the patient needs post-hospital services based on physician/advanced practitioner order or complex needs related to functional status, cognitive ability, or social support system  Outcome: Adequate for Discharge     Problem: Knowledge Deficit  Goal: Patient/family/caregiver demonstrates understanding of disease process, treatment plan, medications, and discharge instructions  Description: Complete learning assessment and assess knowledge base    Interventions:  - Provide teaching at level of understanding  - Provide teaching via preferred learning methods  Outcome: Adequate for Discharge

## 2022-01-07 NOTE — UTILIZATION REVIEW
Initial Clinical Review    Admission: Date/Time/Statement:   Admission Orders (From admission, onward)     Ordered        01/03/22 2157  Inpatient Admission  Once                      Orders Placed This Encounter   Procedures    Inpatient Admission     Standing Status:   Standing     Number of Occurrences:   1     Order Specific Question:   Level of Care     Answer:   Med Surg [16]     Order Specific Question:   Estimated length of stay     Answer:   More than 2 Midnights     Order Specific Question:   Certification     Answer:   I certify that inpatient services are medically necessary for this patient for a duration of greater than two midnights  See H&P and MD Progress Notes for additional information about the patient's course of treatment  ED Arrival Information     Expected Arrival Acuity    - 1/3/2022 17:52 Urgent         Means of arrival Escorted by Service Admission type    [facility]         Arrival complaint            Chief Complaint   Patient presents with    Syncope     Pt with syncopal event and vomiting, witnessed by family  Was sitting at table, did not fall out of chair  No recent injuries  Initial Presentation: 80year old female to the ED from home via EMS  with complaints of passing out, vomiting  Admitted to inpatient for syncope, hypokalemia, pneumonia due to COVID 19, freddie  Had a witnessed syncopal episode and then started vomiting  She arrives with dry mucous membranes, tachypnea  Potassium 2 5, creat 1 35  CT A/P shows: Massive distention of the colon due to constipation with large amount of fecal material in the proximal sigmoid colon which is markedly redundant  Started on IV fluids  Monitor tele  CT head neg for acute abnormality  Not hypoxic  Replete potassium and recheck  Freddie likely in the setting of acute gi losses  Elevated troponin could be due to electrolyte abnormalities  Trend to peak     Date: 1/4   Day 2:   Continue with potassium repletion and IV fluids  Monitor tele  No respiratory distress  ED Triage Vitals   Temperature Pulse Respirations Blood Pressure SpO2   01/03/22 1801 01/03/22 1801 01/03/22 1801 01/03/22 1801 01/03/22 1801   99 1 °F (37 3 °C) 89 (!) 23 162/75 97 %      Temp Source Heart Rate Source Patient Position - Orthostatic VS BP Location FiO2 (%)   01/03/22 1801 -- 01/04/22 2158 01/04/22 2158 --   Oral  Lying Right arm       Pain Score       01/03/22 1801       No Pain          Wt Readings from Last 1 Encounters:   01/03/22 54 7 kg (120 lb 9 5 oz)     Additional Vital Signs:  /Time Temp Pulse Resp BP MAP (mmHg) SpO2 Calculated FIO2 (%) - Nasal Cannula Nasal Cannula O2 Flow Rate (L/min) O2 Device Patient Position - Orthostatic VS   01/04/22 2158 97 9 °F (36 6 °C) 66 18 116/59 85 96 % 26 1 5 L/min Nasal cannula Lying   01/04/22 1549 97 7 °F (36 5 °C) 101 22 -- -- 100 % -- -- None (Room air) --   01/04/22 0900 -- -- -- -- -- 100 % -- -- None (Room air) --   01/03/22 1801 99 1 °F (37 3 °C) 89 23 Abnormal  162/75 -- 97 % -- -- -- --       Pertinent Labs/Diagnostic Test Results:   1/3 CT A/P: Massive distention of the colon due to constipation with large amount of fecal material in the proximal sigmoid colon which is markedly redundant   Findings have worsened compared to prior study  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  1/4 CXR:   No acute cardiopulmonary disease     1/4 EKG: Normal sinus rhythm  Biatrial enlargement  Left axis deviation  Right bundle branch block  Abnormal ECG    Results from last 7 days   Lab Units 01/03/22  1838   SARS-COV-2  Positive*     Results from last 7 days   Lab Units 01/04/22  0447 01/03/22  1905   WBC Thousand/uL 3 63* 6 57   HEMOGLOBIN g/dL 10 4* 11 1*   HEMATOCRIT % 32 4* 34 6*   PLATELETS Thousands/uL 234 254   NEUTROS ABS Thousands/µL 2 22 5 24         Results from last 7 days   Lab Units 01/04/22  1659 01/04/22  0447 01/03/22  1838   SODIUM mmol/L --  147* 146*   POTASSIUM mmol/L 3 7 2 7* 2 5*   CHLORIDE mmol/L  --  109* 104   CO2 mmol/L  --  29 30   ANION GAP mmol/L  --  9 12   BUN mg/dL  --  10 15   CREATININE mg/dL  --  1 03 1 35*   EGFR ml/min/1 73sq m  --  51 37   CALCIUM mg/dL  --  8 0* 8 9   MAGNESIUM mg/dL  --  1 7 2 1     Results from last 7 days   Lab Units 01/04/22  0447 01/03/22  1838   AST U/L 24 26   ALT U/L 21 25   ALK PHOS U/L 44* 55   TOTAL PROTEIN g/dL 5 8* 7 1   ALBUMIN g/dL 2 9* 3 7   TOTAL BILIRUBIN mg/dL 0 41 0 48       Results from last 7 days   Lab Units 01/04/22  0447 01/03/22  1838   GLUCOSE RANDOM mg/dL 97 123         Results from last 7 days   Lab Units 01/03/22  2350 01/03/22  2127 01/03/22  1838   HS TNI 0HR ng/L  --   --  24   HS TNI 2HR ng/L  --  62*  --    HSTNI D2 ng/L  --  38  --    HS TNI 4HR ng/L 59*  --   --    HSTNI D4 ng/L 35  --   --        Results from last 7 days   Lab Units 01/04/22  0447   PROCALCITONIN ng/ml 0 08     Results from last 7 days   Lab Units 01/03/22  1838   LACTIC ACID mmol/L 1 1           Results from last 7 days   Lab Units 01/03/22  1838   LIPASE u/L 113     Results from last 7 days   Lab Units 01/04/22  0447   CRP mg/L <3 0       Results from last 7 days   Lab Units 01/03/22  1838   INFLUENZA A PCR  Negative   INFLUENZA B PCR  Negative   RSV PCR  Negative     ED Treatment:   Medication Administration from 01/03/2022 1751 to 01/04/2022 1455       Date/Time Order Dose Route Action     01/03/2022 2000 sodium chloride 0 9 % infusion 125 mL/hr Intravenous New Bag     01/03/2022 2000 ondansetron (ZOFRAN) injection 4 mg 4 mg Intravenous Given     01/03/2022 2305 potassium chloride 20 mEq IVPB (premix) 20 mEq Intravenous New Bag     01/03/2022 2001 potassium chloride 20 mEq IVPB (premix) 20 mEq Intravenous New Bag     01/04/2022 0929 amLODIPine (NORVASC) tablet 5 mg 5 mg Oral Given     01/04/2022 0930 docusate sodium (COLACE) capsule 100 mg 100 mg Oral Given     01/03/2022 2304 docusate sodium (COLACE) capsule 100 mg 100 mg Oral Given     01/04/2022 0930 senna (SENOKOT) tablet 8 6 mg 8 6 mg Oral Given     01/04/2022 1321 heparin (porcine) subcutaneous injection 5,000 Units 5,000 Units Subcutaneous Given     01/04/2022 0633 heparin (porcine) subcutaneous injection 5,000 Units 5,000 Units Subcutaneous Given     01/03/2022 2305 heparin (porcine) subcutaneous injection 5,000 Units 5,000 Units Subcutaneous Given     01/04/2022 0040 lactated ringers infusion 100 mL/hr Intravenous New Bag     01/03/2022 2304 potassium chloride (K-DUR,KLOR-CON) CR tablet 40 mEq 40 mEq Oral Given     01/04/2022 0041 potassium chloride (K-DUR,KLOR-CON) CR tablet 40 mEq 40 mEq Oral Not Given     01/04/2022 0928 magnesium sulfate IVPB (premix) SOLN 1 g 1 g Intravenous New Bag     01/04/2022 0829 potassium chloride (K-DUR,KLOR-CON) CR tablet 20 mEq 20 mEq Oral Given     01/04/2022 1222 potassium chloride (K-DUR,KLOR-CON) CR tablet 40 mEq 40 mEq Oral Given     01/04/2022 1221 potassium chloride (K-DUR,KLOR-CON) CR tablet 40 mEq 40 mEq Oral Given     01/04/2022 1407 potassium chloride 20 mEq IVPB (premix) 20 mEq Intravenous New Bag     01/04/2022 1229 potassium chloride 20 mEq IVPB (premix) 20 mEq Intravenous New Bag        Past Medical History:   Diagnosis Date    Acute kidney injury ([facility] [address] ) 1/3/2022    Dementia ([facility] [address] )     Hypertension          Admitting Diagnosis: Hypokalemia [E87 6]  Syncope [R55]  Vomiting [R11 10]  COVID-19 virus infection [U07 1]  Age/Sex: 80 y o  female  Admission Orders:  BMP  Scheduled Medications:    Medication Dose Route Frequency    acetaminophen  650 mg Oral Q6H PRN    amLODIPine  5 mg Oral Daily    calcium carbonate  1,000 mg Oral Daily PRN    docusate sodium  100 mg Oral BID    heparin (porcine)  5,000 Units Subcutaneous Q8H [facility]    hydrALAZINE  5 mg Intravenous Q6H PRN    lactated ringers  100 mL/hr Intravenous Continuous    magnesium hydroxide  30 mL Oral Daily PRN    ondansetron  4 mg Intravenous Q6H PRN    potassium chloride  40 mEq Oral Once    potassium chloride  20 mEq Intravenous Q2H    senna  1 tablet Oral Daily    sodium chloride  125 mL/hr Intravenous Continuous       Continuous IV Infusions:    Network Utilization Review Department  ATTENTION: Please call with any questions or concerns to [phone] and carefully listen to the prompts so that you are directed to the right person  All voicemails are confidential   Carissa Barrow all requests for admission clinical reviews, approved or denied determinations and any other requests to dedicated fax number below belonging to the campus where the patient is receiving treatment   List of dedicated fax numbers for the Facilities:  [address] DENIALS (Administrative/Medical Necessity) [phone]   [address] (Maternity/NICU/Pediatrics) [phone]   [address]  [address] [facility] [place] [address] [id number] [place] [address] [address] [facility] [place] [address] [phone]

## 2022-03-21 ENCOUNTER — APPOINTMENT (EMERGENCY)
Dept: CT IMAGING | Facility: HOSPITAL | Age: 82
End: 2022-03-21
Payer: COMMERCIAL

## 2022-03-21 ENCOUNTER — HOSPITAL ENCOUNTER (EMERGENCY)
Facility: HOSPITAL | Age: 82
Discharge: HOME | End: 2022-03-21
Attending: EMERGENCY MEDICINE | Admitting: EMERGENCY MEDICINE
Payer: COMMERCIAL

## 2022-03-21 VITALS
HEART RATE: 94 BPM | SYSTOLIC BLOOD PRESSURE: 171 MMHG | DIASTOLIC BLOOD PRESSURE: 92 MMHG | TEMPERATURE: 97.9 F | OXYGEN SATURATION: 99 % | RESPIRATION RATE: 15 BRPM

## 2022-03-21 DIAGNOSIS — E87.6 HYPOKALEMIA: Primary | ICD-10-CM

## 2022-03-21 LAB
ANION GAP SERPL CALCULATED.3IONS-SCNC: 9 MMOL/L (ref 4–13)
BASOPHILS # BLD AUTO: 0.07 THOUSANDS/ΜL (ref 0–0.1)
BASOPHILS NFR BLD AUTO: 1 % (ref 0–1)
BUN SERPL-MCNC: 16 MG/DL (ref 5–25)
CALCIUM SERPL-MCNC: 9.1 MG/DL (ref 8.3–10.1)
CHLORIDE SERPL-SCNC: 106 MMOL/L (ref 100–108)
CO2 SERPL-SCNC: 30 MMOL/L (ref 21–32)
CREAT SERPL-MCNC: 1.03 MG/DL (ref 0.6–1.3)
EOSINOPHIL # BLD AUTO: 0.26 THOUSAND/ΜL (ref 0–0.61)
EOSINOPHIL NFR BLD AUTO: 5 % (ref 0–6)
ERYTHROCYTE [DISTWIDTH] IN BLOOD BY AUTOMATED COUNT: 14.3 % (ref 11.6–15.1)
GFR SERPL CREATININE-BSD FRML MDRD: 51 ML/MIN/1.73SQ M
GLUCOSE SERPL-MCNC: 117 MG/DL (ref 65–140)
HCT VFR BLD AUTO: 32.8 % (ref 34.8–46.1)
HGB BLD-MCNC: 10.3 G/DL (ref 11.5–15.4)
IMM GRANULOCYTES # BLD AUTO: 0.01 THOUSAND/UL (ref 0–0.2)
IMM GRANULOCYTES NFR BLD AUTO: 0 % (ref 0–2)
LYMPHOCYTES # BLD AUTO: 1.81 THOUSANDS/ΜL (ref 0.6–4.47)
LYMPHOCYTES NFR BLD AUTO: 35 % (ref 14–44)
MCH RBC QN AUTO: 27.7 PG (ref 26.8–34.3)
MCHC RBC AUTO-ENTMCNC: 31.4 G/DL (ref 31.4–37.4)
MCV RBC AUTO: 88 FL (ref 82–98)
MONOCYTES # BLD AUTO: 0.53 THOUSAND/ΜL (ref 0.17–1.22)
MONOCYTES NFR BLD AUTO: 10 % (ref 4–12)
NEUTROPHILS # BLD AUTO: 2.45 THOUSANDS/ΜL (ref 1.85–7.62)
NEUTS SEG NFR BLD AUTO: 49 % (ref 43–75)
NRBC BLD AUTO-RTO: 0 /100 WBCS
PLATELET # BLD AUTO: 348 THOUSANDS/UL (ref 149–390)
PMV BLD AUTO: 9.9 FL (ref 8.9–12.7)
POTASSIUM SERPL-SCNC: 2.1 MMOL/L (ref 3.5–5.3)
RBC # BLD AUTO: 3.72 MILLION/UL (ref 3.81–5.12)
SODIUM SERPL-SCNC: 145 MMOL/L (ref 136–145)
WBC # BLD AUTO: 5.13 THOUSAND/UL (ref 4.31–10.16)

## 2022-03-21 PROCEDURE — 99284 EMERGENCY DEPT VISIT MOD MDM: CPT | Performed by: EMERGENCY MEDICINE

## 2022-03-21 PROCEDURE — 85025 COMPLETE CBC W/AUTO DIFF WBC: CPT | Performed by: EMERGENCY MEDICINE

## 2022-03-21 PROCEDURE — 93005 ELECTROCARDIOGRAM TRACING: CPT

## 2022-03-21 PROCEDURE — 99284 EMERGENCY DEPT VISIT MOD MDM: CPT

## 2022-03-21 PROCEDURE — 74176 CT ABD & PELVIS W/O CONTRAST: CPT

## 2022-03-21 PROCEDURE — 36415 COLL VENOUS BLD VENIPUNCTURE: CPT | Performed by: EMERGENCY MEDICINE

## 2022-03-21 PROCEDURE — 80048 BASIC METABOLIC PNL TOTAL CA: CPT | Performed by: EMERGENCY MEDICINE

## 2022-03-21 RX ORDER — POTASSIUM CHLORIDE 20 MEQ/1
40 TABLET, EXTENDED RELEASE ORAL ONCE
Status: COMPLETED | OUTPATIENT
Start: 2022-03-21 | End: 2022-03-21

## 2022-03-21 RX ORDER — POTASSIUM CHLORIDE 20 MEQ/1
20 TABLET, EXTENDED RELEASE ORAL 2 TIMES DAILY
Qty: 60 TABLET | Refills: 0 | Status: SHIPPED | OUTPATIENT
Start: 2022-03-21

## 2022-03-21 RX ADMIN — POTASSIUM CHLORIDE 40 MEQ: 1500 TABLET, EXTENDED RELEASE ORAL at 20:03

## 2022-03-21 NOTE — ED PROVIDER NOTES
History  Chief Complaint   Patient presents with    Abnormal Lab     sent by pcp for low K      81 yo female sent to ED for hypokalemia  Son provides most of the history, pt has PMH of dementia  Son reports prior hypoK; states pt has very poor diet and eats very little (this is chronic and unchanged from baseline)  She is not any diuretics  He denies any recent weakness or fatigue  Only other concern is recent abdominal pain and distension which son states may be due to constipation  Prior to Admission Medications   Prescriptions Last Dose Informant Patient Reported? Taking? amLODIPine (NORVASC) 5 mg tablet   No No   Sig: Take 1 tablet (5 mg total) by mouth daily   docusate sodium (COLACE) 100 mg capsule   No No   Sig: Take 1 capsule (100 mg total) by mouth 2 (two) times a day   lidocaine (LIDODERM) 5 %   No No   Sig: APPLY 1 PATCH TOPICALLY DAILY REMOVE & DISCARD PATCH WITHIN 12 HOURS OR AS DIRECTED BY MD   lisinopril (ZESTRIL) 20 mg tablet   No No   Sig: TAKE 1 TABLET BY MOUTH EVERY DAY   senna (SENOKOT) 8 6 mg   No No   Sig: Take 1 tablet (8 6 mg total) by mouth daily      Facility-Administered Medications: None       Past Medical History:   Diagnosis Date    Acute kidney injury ([facility] [facility] ) 1/3/2022    Dementia ([facility] [address] )     Hypertension        No past surgical history on file  Family History   Problem Relation Age of Onset    No Known Problems Mother     No Known Problems Father      I have reviewed and agree with the history as documented      E-Cigarette/Vaping    E-Cigarette Use Never User      E-Cigarette/Vaping Substances    Nicotine No     THC No     CBD No     Flavoring No     Other No     Unknown No      Social History     Tobacco Use    Smoking status: Never Smoker    Smokeless tobacco: Never Used   Vaping Use    Vaping Use: Never used   Substance Use Topics    Alcohol use: Never    Drug use: Never       Review of Systems   Gastrointestinal: Positive for abdominal distention, abdominal pain and constipation  All other systems reviewed and are negative  Physical Exam  Physical Exam  Vitals and nursing note reviewed  Constitutional:       General: She is not in acute distress  Appearance: Normal appearance  She is well-developed  She is not ill-appearing, toxic-appearing or diaphoretic  HENT:      Head: Normocephalic and atraumatic  Eyes:      Conjunctiva/sclera: Conjunctivae normal       Pupils: Pupils are equal, round, and reactive to light  Neck:      Vascular: No JVD  Cardiovascular:      Rate and Rhythm: Normal rate and regular rhythm  Pulses: Normal pulses  Heart sounds: Normal heart sounds  Pulmonary:      Effort: Pulmonary effort is normal       Breath sounds: Normal breath sounds  No stridor  Abdominal:      General: There is distension  Palpations: Abdomen is soft  Tenderness: There is no abdominal tenderness  There is no guarding or rebound  Musculoskeletal:         General: No swelling, tenderness, deformity or signs of injury  Normal range of motion  Cervical back: Normal range of motion and neck supple  No rigidity or tenderness  Skin:     General: Skin is warm and dry  Capillary Refill: Capillary refill takes less than 2 seconds  Coloration: Skin is not pale  Findings: No erythema or rash  Neurological:      Mental Status: She is alert  Cranial Nerves: No cranial nerve deficit  Sensory: No sensory deficit  Motor: No weakness or abnormal muscle tone        Coordination: Coordination normal          Vital Signs  ED Triage Vitals   Temperature Pulse Respirations Blood Pressure SpO2   03/21/22 1733 03/21/22 1733 03/21/22 1930 03/21/22 1733 03/21/22 1733   97 9 °F (36 6 °C) 102 15 164/74 95 %      Temp Source Heart Rate Source Patient Position - Orthostatic VS BP Location FiO2 (%)   03/21/22 1733 03/21/22 1733 03/21/22 1733 03/21/22 1733 --   Temporal Monitor Sitting Left arm       Pain Score --                  Vitals:    03/21/22 1733 03/21/22 1930   BP: 164/74 (!) 171/92   Pulse: 102 94   Patient Position - Orthostatic VS: Sitting Sitting         Visual Acuity      ED Medications  Medications   potassium chloride (K-DUR,KLOR-CON) CR tablet 40 mEq (40 mEq Oral Given 3/21/22 2003)       Diagnostic Studies  Results Reviewed     Procedure Component Value Units Date/Time    Basic metabolic panel [[id number]]  (Abnormal) Collected: 03/21/22 1905    Lab Status: Final result Specimen: Blood from Arm, Left Updated: 03/21/22 1941     Sodium 145 mmol/L      Potassium 2 1 mmol/L      Chloride 106 mmol/L      CO2 30 mmol/L      ANION GAP 9 mmol/L      BUN 16 mg/dL      Creatinine 1 03 mg/dL      Glucose 117 mg/dL      Calcium 9 1 mg/dL      eGFR 51 ml/min/1 73sq m     Narrative:      [place] guidelines for Chronic Kidney Disease (CKD):     Stage 1 with normal or high GFR (GFR > 90 mL/min/1 73 square meters)    Stage 2 Mild CKD (GFR = 60-89 mL/min/1 73 square meters)    Stage 3A Moderate CKD (GFR = 45-59 mL/min/1 73 square meters)    Stage 3B Moderate CKD (GFR = 30-44 mL/min/1 73 square meters)    Stage 4 Severe CKD (GFR = 15-29 mL/min/1 73 square meters)    Stage 5 End Stage CKD (GFR <15 mL/min/1 73 square meters)  Note: GFR calculation is accurate only with a steady state creatinine    CBC and differential [[id number]]  (Abnormal) Collected: 03/21/22 1905    Lab Status: Final result Specimen: Blood from Arm, Left Updated: 03/21/22 1915     WBC 5 13 Thousand/uL      RBC 3 72 Million/uL      Hemoglobin 10 3 g/dL      Hematocrit 32 8 %      MCV 88 fL      MCH 27 7 pg      MCHC 31 4 g/dL      RDW 14 3 %      MPV 9 9 fL      Platelets 346 Thousands/uL      nRBC 0 /100 WBCs      Neutrophils Relative 49 %      Immat GRANS % 0 %      Lymphocytes Relative 35 %      Monocytes Relative 10 %      Eosinophils Relative 5 %      Basophils Relative 1 %      Neutrophils Absolute 2 45 Thousands/µL Immature Grans Absolute 0 01 Thousand/uL      Lymphocytes Absolute 1 81 Thousands/µL      Monocytes Absolute 0 53 Thousand/µL      Eosinophils Absolute 0 26 Thousand/µL      Basophils Absolute 0 07 Thousands/µL                  CT abdomen pelvis wo contrast   Final Result by Teri Arreola MD (03/21 1933)      Mostly unchanged gas distended redundant sigmoid colon  Stool impacted ascending colon  No acute intra-abdominal finding  Workstation performed: [id number]                    Procedures  Procedures         ED Course                                             MDM  Number of Diagnoses or Management Options  Hypokalemia  Diagnosis management comments: Well appearing elderly pt with incidentally noted hypoK  I offered admission for repletion, son at bedside states pt would not want to be admitted and he prefers she be discharged with him, he is pt's caregiver  I will prescribe potassium (she was previously on this for hypokalemia) and encouraged f/u with pcp for short-term repeat K level  Amount and/or Complexity of Data Reviewed  Clinical lab tests: ordered and reviewed  Tests in the radiology section of CPT®: ordered and reviewed        Disposition  Final diagnoses:   Hypokalemia     Time reflects when diagnosis was documented in both MDM as applicable and the Disposition within this note     Time User Action Codes Description Comment    3/21/2022  7:50 PM Umu Delgadillo [E87 6] Hypokalemia       ED Disposition     ED Disposition Condition Date/Time Comment    Discharge Stable Mon Mar 21, 2022  7:50 PM Megan Brooks discharge to home/self care              Follow-up Information     Follow up With Specialties Details Why [address]dard, DO Family Medicine In 1 week  [address] [address]  [phone]            Patient's Medications   Discharge Prescriptions    POTASSIUM CHLORIDE (K-DUR,KLOR-CON) 20 MEQ TABLET    Take 1 tablet (20 mEq total) by mouth 2 (two) times a day       Start Date: 3/21/2022 End Date: --       Order Dose: 20 mEq       Quantity: 60 tablet    Refills: 0       No discharge procedures on file      PDMP Review       Value Time User    PDMP Reviewed  Yes 9/28/2020 12:30 PM Bc Del Real PA-C          ED Provider  Electronically Signed by           Janae Bunch MD  03/21/22 2018

## 2022-03-27 LAB
ATRIAL RATE: 96 BPM
P AXIS: 41 DEGREES
PR INTERVAL: 184 MS
QRS AXIS: -29 DEGREES
QRSD INTERVAL: 128 MS
QT INTERVAL: 402 MS
QTC INTERVAL: 507 MS
T WAVE AXIS: 38 DEGREES
VENTRICULAR RATE: 96 BPM

## 2022-03-27 PROCEDURE — 93010 ELECTROCARDIOGRAM REPORT: CPT | Performed by: INTERNAL MEDICINE

## 2022-03-28 DIAGNOSIS — I16.0 HYPERTENSIVE URGENCY: ICD-10-CM

## 2022-03-28 RX ORDER — LISINOPRIL 20 MG/1
TABLET ORAL
Qty: 90 TABLET | Refills: 0 | Status: SHIPPED | OUTPATIENT
Start: 2022-03-28 | End: 2022-06-22

## 2022-06-06 ENCOUNTER — TELEPHONE (OUTPATIENT)
Dept: INTERNAL MEDICINE CLINIC | Facility: CLINIC | Age: 82
End: 2022-06-06

## 2022-06-22 DIAGNOSIS — I16.0 HYPERTENSIVE URGENCY: ICD-10-CM

## 2022-06-22 RX ORDER — LISINOPRIL 20 MG/1
TABLET ORAL
Qty: 90 TABLET | Refills: 0 | Status: SHIPPED | OUTPATIENT
Start: 2022-06-22

## 2022-10-12 PROBLEM — J12.82 PNEUMONIA DUE TO COVID-19 VIRUS: Status: RESOLVED | Noted: 2022-01-03 | Resolved: 2022-10-12

## 2022-10-12 PROBLEM — U07.1 PNEUMONIA DUE TO COVID-19 VIRUS: Status: RESOLVED | Noted: 2022-01-03 | Resolved: 2022-10-12

## 2023-11-23 ENCOUNTER — HOSPITAL ENCOUNTER (INPATIENT)
Facility: HOSPITAL | Age: 83
LOS: 1 days | Discharge: HOME | DRG: 071 | End: 2023-11-24
Attending: EMERGENCY MEDICINE | Admitting: INTERNAL MEDICINE
Payer: COMMERCIAL

## 2023-11-23 ENCOUNTER — APPOINTMENT (EMERGENCY)
Dept: RADIOLOGY | Facility: HOSPITAL | Age: 83
DRG: 071 | End: 2023-11-23
Payer: COMMERCIAL

## 2023-11-23 ENCOUNTER — APPOINTMENT (EMERGENCY)
Dept: CT IMAGING | Facility: HOSPITAL | Age: 83
DRG: 071 | End: 2023-11-23
Payer: COMMERCIAL

## 2023-11-23 DIAGNOSIS — G93.40 ENCEPHALOPATHY: ICD-10-CM

## 2023-11-23 DIAGNOSIS — R26.81 UNSTEADY GAIT: ICD-10-CM

## 2023-11-23 DIAGNOSIS — I10 HYPERTENSION: ICD-10-CM

## 2023-11-23 DIAGNOSIS — K29.00 ACUTE GASTRITIS WITHOUT HEMORRHAGE, UNSPECIFIED GASTRITIS TYPE: ICD-10-CM

## 2023-11-23 DIAGNOSIS — Z86.59 HISTORY OF DEMENTIA: ICD-10-CM

## 2023-11-23 DIAGNOSIS — E87.0 HYPERNATREMIA: ICD-10-CM

## 2023-11-23 DIAGNOSIS — N17.9 AKI (ACUTE KIDNEY INJURY) (HCC): Primary | ICD-10-CM

## 2023-11-23 PROBLEM — K29.70 GASTRITIS: Status: ACTIVE | Noted: 2023-11-23

## 2023-11-23 PROBLEM — G93.41 ACUTE METABOLIC ENCEPHALOPATHY: Status: ACTIVE | Noted: 2023-11-23

## 2023-11-23 LAB
2HR DELTA HS TROPONIN: 17 NG/L
4HR DELTA HS TROPONIN: 31 NG/L
ALBUMIN SERPL BCP-MCNC: 4.6 G/DL (ref 3.5–5)
ALP SERPL-CCNC: 39 U/L (ref 34–104)
ALT SERPL W P-5'-P-CCNC: 7 U/L (ref 7–52)
ANION GAP SERPL CALCULATED.3IONS-SCNC: 7 MMOL/L
AST SERPL W P-5'-P-CCNC: 16 U/L (ref 13–39)
BACTERIA UR QL AUTO: ABNORMAL /HPF
BASOPHILS # BLD AUTO: 0.05 THOUSANDS/ÂΜL (ref 0–0.1)
BASOPHILS NFR BLD AUTO: 1 % (ref 0–1)
BILIRUB DIRECT SERPL-MCNC: 0.08 MG/DL (ref 0–0.2)
BILIRUB SERPL-MCNC: 0.7 MG/DL (ref 0.2–1)
BILIRUB UR QL STRIP: NEGATIVE
BUN SERPL-MCNC: 28 MG/DL (ref 5–25)
CALCIUM SERPL-MCNC: 10.4 MG/DL (ref 8.4–10.2)
CARDIAC TROPONIN I PNL SERPL HS: 12 NG/L
CARDIAC TROPONIN I PNL SERPL HS: 29 NG/L
CARDIAC TROPONIN I PNL SERPL HS: 43 NG/L
CHLORIDE SERPL-SCNC: 112 MMOL/L (ref 96–108)
CLARITY UR: CLEAR
CO2 SERPL-SCNC: 30 MMOL/L (ref 21–32)
COLOR UR: ABNORMAL
CREAT SERPL-MCNC: 1.32 MG/DL (ref 0.6–1.3)
EOSINOPHIL # BLD AUTO: 0.12 THOUSAND/ÂΜL (ref 0–0.61)
EOSINOPHIL NFR BLD AUTO: 2 % (ref 0–6)
ERYTHROCYTE [DISTWIDTH] IN BLOOD BY AUTOMATED COUNT: 14 % (ref 11.6–15.1)
GFR SERPL CREATININE-BSD FRML MDRD: 37 ML/MIN/1.73SQ M
GLUCOSE SERPL-MCNC: 79 MG/DL (ref 65–140)
GLUCOSE SERPL-MCNC: 83 MG/DL (ref 65–140)
GLUCOSE UR STRIP-MCNC: NEGATIVE MG/DL
HCT VFR BLD AUTO: 40.1 % (ref 34.8–46.1)
HGB BLD-MCNC: 12.9 G/DL (ref 11.5–15.4)
HGB UR QL STRIP.AUTO: NEGATIVE
HYALINE CASTS #/AREA URNS LPF: ABNORMAL /LPF
IMM GRANULOCYTES # BLD AUTO: 0.01 THOUSAND/UL (ref 0–0.2)
IMM GRANULOCYTES NFR BLD AUTO: 0 % (ref 0–2)
KETONES UR STRIP-MCNC: NEGATIVE MG/DL
LEUKOCYTE ESTERASE UR QL STRIP: NEGATIVE
LYMPHOCYTES # BLD AUTO: 1.55 THOUSANDS/ÂΜL (ref 0.6–4.47)
LYMPHOCYTES NFR BLD AUTO: 27 % (ref 14–44)
MAGNESIUM SERPL-MCNC: 2.3 MG/DL (ref 1.9–2.7)
MCH RBC QN AUTO: 27.9 PG (ref 26.8–34.3)
MCHC RBC AUTO-ENTMCNC: 32.2 G/DL (ref 31.4–37.4)
MCV RBC AUTO: 87 FL (ref 82–98)
MONOCYTES # BLD AUTO: 0.47 THOUSAND/ÂΜL (ref 0.17–1.22)
MONOCYTES NFR BLD AUTO: 8 % (ref 4–12)
NEUTROPHILS # BLD AUTO: 3.58 THOUSANDS/ÂΜL (ref 1.85–7.62)
NEUTS SEG NFR BLD AUTO: 62 % (ref 43–75)
NITRITE UR QL STRIP: NEGATIVE
NON-SQ EPI CELLS URNS QL MICRO: ABNORMAL /HPF
NRBC BLD AUTO-RTO: 0 /100 WBCS
PH UR STRIP.AUTO: 6.5 [PH]
PLATELET # BLD AUTO: 263 THOUSANDS/UL (ref 149–390)
PMV BLD AUTO: 10.6 FL (ref 8.9–12.7)
POTASSIUM SERPL-SCNC: 4.1 MMOL/L (ref 3.5–5.3)
PROT SERPL-MCNC: 7.5 G/DL (ref 6.4–8.4)
PROT UR STRIP-MCNC: ABNORMAL MG/DL
RBC # BLD AUTO: 4.63 MILLION/UL (ref 3.81–5.12)
RBC #/AREA URNS AUTO: ABNORMAL /HPF
SODIUM SERPL-SCNC: 149 MMOL/L (ref 135–147)
SP GR UR STRIP.AUTO: 1.01 (ref 1–1.03)
TSH SERPL DL<=0.05 MIU/L-ACNC: 1.73 UIU/ML (ref 0.45–4.5)
UROBILINOGEN UR STRIP-ACNC: <2 MG/DL
WBC # BLD AUTO: 5.78 THOUSAND/UL (ref 4.31–10.16)
WBC #/AREA URNS AUTO: ABNORMAL /HPF

## 2023-11-23 PROCEDURE — 80076 HEPATIC FUNCTION PANEL: CPT | Performed by: EMERGENCY MEDICINE

## 2023-11-23 PROCEDURE — 70450 CT HEAD/BRAIN W/O DYE: CPT

## 2023-11-23 PROCEDURE — 84443 ASSAY THYROID STIM HORMONE: CPT | Performed by: EMERGENCY MEDICINE

## 2023-11-23 PROCEDURE — 85025 COMPLETE CBC W/AUTO DIFF WBC: CPT | Performed by: EMERGENCY MEDICINE

## 2023-11-23 PROCEDURE — 99285 EMERGENCY DEPT VISIT HI MDM: CPT

## 2023-11-23 PROCEDURE — C9113 INJ PANTOPRAZOLE SODIUM, VIA: HCPCS | Performed by: PHYSICIAN ASSISTANT

## 2023-11-23 PROCEDURE — 82948 REAGENT STRIP/BLOOD GLUCOSE: CPT

## 2023-11-23 PROCEDURE — 93005 ELECTROCARDIOGRAM TRACING: CPT

## 2023-11-23 PROCEDURE — 99223 1ST HOSP IP/OBS HIGH 75: CPT | Performed by: PHYSICIAN ASSISTANT

## 2023-11-23 PROCEDURE — 74177 CT ABD & PELVIS W/CONTRAST: CPT

## 2023-11-23 PROCEDURE — 96365 THER/PROPH/DIAG IV INF INIT: CPT

## 2023-11-23 PROCEDURE — G1004 CDSM NDSC: HCPCS

## 2023-11-23 PROCEDURE — 81001 URINALYSIS AUTO W/SCOPE: CPT | Performed by: EMERGENCY MEDICINE

## 2023-11-23 PROCEDURE — 71045 X-RAY EXAM CHEST 1 VIEW: CPT

## 2023-11-23 PROCEDURE — 83735 ASSAY OF MAGNESIUM: CPT | Performed by: EMERGENCY MEDICINE

## 2023-11-23 PROCEDURE — 84484 ASSAY OF TROPONIN QUANT: CPT | Performed by: EMERGENCY MEDICINE

## 2023-11-23 PROCEDURE — 99285 EMERGENCY DEPT VISIT HI MDM: CPT | Performed by: EMERGENCY MEDICINE

## 2023-11-23 PROCEDURE — 36415 COLL VENOUS BLD VENIPUNCTURE: CPT | Performed by: EMERGENCY MEDICINE

## 2023-11-23 PROCEDURE — 80048 BASIC METABOLIC PNL TOTAL CA: CPT | Performed by: EMERGENCY MEDICINE

## 2023-11-23 RX ORDER — AMLODIPINE BESYLATE 5 MG/1
5 TABLET ORAL DAILY
Status: DISCONTINUED | OUTPATIENT
Start: 2023-11-24 | End: 2023-11-24 | Stop reason: HOSPADM

## 2023-11-23 RX ORDER — HEPARIN SODIUM 5000 [USP'U]/ML
5000 INJECTION, SOLUTION INTRAVENOUS; SUBCUTANEOUS EVERY 8 HOURS SCHEDULED
Status: DISCONTINUED | OUTPATIENT
Start: 2023-11-24 | End: 2023-11-24 | Stop reason: HOSPADM

## 2023-11-23 RX ORDER — DEXTROSE MONOHYDRATE 50 MG/ML
50 INJECTION, SOLUTION INTRAVENOUS CONTINUOUS
Status: DISPENSED | OUTPATIENT
Start: 2023-11-23 | End: 2023-11-24

## 2023-11-23 RX ORDER — ACETAMINOPHEN 325 MG/1
650 TABLET ORAL EVERY 6 HOURS PRN
Status: DISCONTINUED | OUTPATIENT
Start: 2023-11-23 | End: 2023-11-24 | Stop reason: HOSPADM

## 2023-11-23 RX ORDER — LANOLIN ALCOHOL/MO/W.PET/CERES
3 CREAM (GRAM) TOPICAL
Status: DISCONTINUED | OUTPATIENT
Start: 2023-11-23 | End: 2023-11-24 | Stop reason: HOSPADM

## 2023-11-23 RX ORDER — SENNOSIDES 8.6 MG
8.6 TABLET ORAL
Status: DISCONTINUED | OUTPATIENT
Start: 2023-11-23 | End: 2023-11-24 | Stop reason: HOSPADM

## 2023-11-23 RX ORDER — POLYETHYLENE GLYCOL 3350 17 G/17G
17 POWDER, FOR SOLUTION ORAL DAILY
Status: DISCONTINUED | OUTPATIENT
Start: 2023-11-24 | End: 2023-11-24 | Stop reason: HOSPADM

## 2023-11-23 RX ORDER — PANTOPRAZOLE SODIUM 40 MG/10ML
40 INJECTION, POWDER, LYOPHILIZED, FOR SOLUTION INTRAVENOUS
Status: DISCONTINUED | OUTPATIENT
Start: 2023-11-23 | End: 2023-11-24 | Stop reason: HOSPADM

## 2023-11-23 RX ORDER — LABETALOL HYDROCHLORIDE 5 MG/ML
10 INJECTION, SOLUTION INTRAVENOUS EVERY 4 HOURS PRN
Status: DISCONTINUED | OUTPATIENT
Start: 2023-11-24 | End: 2023-11-24 | Stop reason: HOSPADM

## 2023-11-23 RX ORDER — LABETALOL HYDROCHLORIDE 5 MG/ML
10 INJECTION, SOLUTION INTRAVENOUS ONCE
Status: COMPLETED | OUTPATIENT
Start: 2023-11-23 | End: 2023-11-23

## 2023-11-23 RX ORDER — MAGNESIUM HYDROXIDE/ALUMINUM HYDROXICE/SIMETHICONE 120; 1200; 1200 MG/30ML; MG/30ML; MG/30ML
30 SUSPENSION ORAL EVERY 6 HOURS PRN
Status: DISCONTINUED | OUTPATIENT
Start: 2023-11-23 | End: 2023-11-24 | Stop reason: HOSPADM

## 2023-11-23 RX ADMIN — DEXTROSE 50 ML/HR: 5 SOLUTION INTRAVENOUS at 22:04

## 2023-11-23 RX ADMIN — LABETALOL HYDROCHLORIDE 10 MG: 5 INJECTION, SOLUTION INTRAVENOUS at 20:15

## 2023-11-23 RX ADMIN — PANTOPRAZOLE SODIUM 40 MG: 40 INJECTION, POWDER, FOR SOLUTION INTRAVENOUS at 21:45

## 2023-11-23 RX ADMIN — SODIUM CHLORIDE, SODIUM LACTATE, POTASSIUM CHLORIDE, AND CALCIUM CHLORIDE 1000 ML: .6; .31; .03; .02 INJECTION, SOLUTION INTRAVENOUS at 16:40

## 2023-11-23 RX ADMIN — Medication 3 MG: at 22:04

## 2023-11-23 RX ADMIN — IOHEXOL 100 ML: 350 INJECTION, SOLUTION INTRAVENOUS at 16:33

## 2023-11-23 RX ADMIN — SENNOSIDES 8.6 MG: 8.6 TABLET, FILM COATED ORAL at 21:45

## 2023-11-23 NOTE — Clinical Note
Case was discussed with Dr Dionne Griffin and the patient's admission status was agreed to be Admission Status: observation status to the service of Dr. Dionne Griffin .

## 2023-11-24 VITALS
TEMPERATURE: 97.6 F | RESPIRATION RATE: 17 BRPM | BODY MASS INDEX: 18.55 KG/M2 | SYSTOLIC BLOOD PRESSURE: 164 MMHG | DIASTOLIC BLOOD PRESSURE: 99 MMHG | OXYGEN SATURATION: 99 % | HEART RATE: 76 BPM | WEIGHT: 111.33 LBS | HEIGHT: 65 IN

## 2023-11-24 PROBLEM — E87.0 HYPERNATREMIA: Status: RESOLVED | Noted: 2023-11-23 | Resolved: 2023-11-24

## 2023-11-24 PROBLEM — N17.9 AKI (ACUTE KIDNEY INJURY) (HCC): Status: RESOLVED | Noted: 2022-01-03 | Resolved: 2023-11-24

## 2023-11-24 PROBLEM — G93.41 ACUTE METABOLIC ENCEPHALOPATHY: Status: RESOLVED | Noted: 2023-11-23 | Resolved: 2023-11-24

## 2023-11-24 LAB
ANION GAP SERPL CALCULATED.3IONS-SCNC: 6 MMOL/L
ATRIAL RATE: 101 BPM
ATRIAL RATE: 95 BPM
BUN SERPL-MCNC: 22 MG/DL (ref 5–25)
CALCIUM SERPL-MCNC: 9.5 MG/DL (ref 8.4–10.2)
CHLORIDE SERPL-SCNC: 110 MMOL/L (ref 96–108)
CO2 SERPL-SCNC: 29 MMOL/L (ref 21–32)
CREAT SERPL-MCNC: 1.01 MG/DL (ref 0.6–1.3)
GFR SERPL CREATININE-BSD FRML MDRD: 52 ML/MIN/1.73SQ M
GLUCOSE SERPL-MCNC: 148 MG/DL (ref 65–140)
GLUCOSE SERPL-MCNC: 93 MG/DL (ref 65–140)
P AXIS: 70 DEGREES
P AXIS: 70 DEGREES
POTASSIUM SERPL-SCNC: 3.3 MMOL/L (ref 3.5–5.3)
PR INTERVAL: 156 MS
PR INTERVAL: 162 MS
QRS AXIS: -49 DEGREES
QRS AXIS: -58 DEGREES
QRSD INTERVAL: 122 MS
QRSD INTERVAL: 122 MS
QT INTERVAL: 380 MS
QT INTERVAL: 388 MS
QTC INTERVAL: 477 MS
QTC INTERVAL: 503 MS
SODIUM SERPL-SCNC: 145 MMOL/L (ref 135–147)
T WAVE AXIS: 61 DEGREES
T WAVE AXIS: 74 DEGREES
VENTRICULAR RATE: 101 BPM
VENTRICULAR RATE: 95 BPM

## 2023-11-24 PROCEDURE — 99239 HOSP IP/OBS DSCHRG MGMT >30: CPT | Performed by: STUDENT IN AN ORGANIZED HEALTH CARE EDUCATION/TRAINING PROGRAM

## 2023-11-24 PROCEDURE — 93010 ELECTROCARDIOGRAM REPORT: CPT | Performed by: INTERNAL MEDICINE

## 2023-11-24 PROCEDURE — C9113 INJ PANTOPRAZOLE SODIUM, VIA: HCPCS | Performed by: PHYSICIAN ASSISTANT

## 2023-11-24 PROCEDURE — 80048 BASIC METABOLIC PNL TOTAL CA: CPT | Performed by: PHYSICIAN ASSISTANT

## 2023-11-24 RX ORDER — POTASSIUM CHLORIDE 14.9 MG/ML
20 INJECTION INTRAVENOUS ONCE
Status: COMPLETED | OUTPATIENT
Start: 2023-11-24 | End: 2023-11-24

## 2023-11-24 RX ORDER — OMEPRAZOLE 40 MG/1
40 CAPSULE, DELAYED RELEASE ORAL 2 TIMES DAILY
Qty: 28 CAPSULE | Refills: 0 | Status: SHIPPED | OUTPATIENT
Start: 2023-11-24

## 2023-11-24 RX ADMIN — POLYETHYLENE GLYCOL 3350 17 G: 17 POWDER, FOR SOLUTION ORAL at 08:28

## 2023-11-24 RX ADMIN — POTASSIUM CHLORIDE 20 MEQ: 14.9 INJECTION, SOLUTION INTRAVENOUS at 08:23

## 2023-11-24 RX ADMIN — HEPARIN SODIUM 5000 UNITS: 5000 INJECTION INTRAVENOUS; SUBCUTANEOUS at 05:06

## 2023-11-24 RX ADMIN — PANTOPRAZOLE SODIUM 40 MG: 40 INJECTION, POWDER, FOR SOLUTION INTRAVENOUS at 08:21

## 2023-11-24 RX ADMIN — AMLODIPINE BESYLATE 5 MG: 5 TABLET ORAL at 08:28

## 2023-11-24 NOTE — UTILIZATION REVIEW
Initial Clinical Review    Admission: Date/Time/Statement:   Admission Orders (From admission, onward)       Ordered        11/23/23 1950  INPATIENT ADMISSION  Once                          Orders Placed This Encounter   Procedures    INPATIENT ADMISSION     Standing Status:   Standing     Number of Occurrences:   1     Order Specific Question:   Level of Care     Answer:   Med Surg [16]     Order Specific Question:   Estimated length of stay     Answer:   More than 2 Midnights     Order Specific Question:   Certification     Answer:   I certify that inpatient services are medically necessary for this patient for a duration of greater than two midnights. See H&P and MD Progress Notes for additional information about the patient's course of treatment. ED Arrival Information       Expected   -    Arrival   11/23/2023 15:02    Acuity   Urgent              Means of arrival   Ambulance    Escorted by   [facility]    Service   Hospitalist    Admission type   Emergency              Arrival complaint   weakness             Chief Complaint   Patient presents with    Altered Mental Status     Pt. Presents to ER by EMS from son's home. Pt. Is nonverbal, pt. Son states "she woke up and was slower than normal. She ate a bowl of cereal,  she sat at the table and started like she was breathing hard and then maybe like she was in pain, she did vomit and then seemed like she couldn't breathe so I called 911."        Initial Presentation: 82 y.o. female to ED from home via EMS w/ PMH of dementia, hypertension, spinal stenosis who presents with complaint of appearing more lethargic than normal and appeared to be dry heaving and possibly in pain per patient's son. At baseline pt is nonverbal sec to dementia . Per son woke up ate a bowl of cereal and then vomited and appeared more lethargic than usual . CT head neg .  CT abdomen pelvis revealing mild thickening of esophagus and stomach related to esophagitis and gastritis, no obstruction seen . Admitted IP status w/ acute metabolic encephalopathy , gastritis , hypernatremia . Plan for IVF , full liq diet , IV PPI , recheck BMP in am . VERONICA CR 1.32 baseline 0.8-0.9 cont IVF and monitor BMP . HTN IV labetalol given in ED , cont prn . Restart amlodipine and follow . Constipation d/t extensive fecal matter , start senna and mirilax . ED Triage Vitals   Temperature Pulse Respirations Blood Pressure SpO2   11/23/23 1513 11/23/23 1513 11/23/23 1513 11/23/23 1513 11/23/23 1513   98 °F (36.7 °C) 95 18 (!) 171/103 95 %      Temp Source Heart Rate Source Patient Position - Orthostatic VS BP Location FiO2 (%)   11/23/23 1513 11/23/23 1513 11/23/23 1513 11/23/23 1513 --   Oral Monitor Sitting Right arm       Pain Score       11/23/23 2031       No Pain          Wt Readings from Last 1 Encounters:   11/23/23 50.5 kg (111 lb 5.3 oz)     Additional Vital Signs:   11/24/23 00:19:28 97.3 °F (36.3 °C) Abnormal  86 -- 159/94 116 97 % -- --   11/24/23 00:19:03 97.3 °F (36.3 °C) Abnormal  86 -- 159/94 116 98 % -- --   11/23/23 21:55:52 -- 98 -- 179/112 Abnormal  134 95 % -- --   11/23/23 20:31:52 99.2 °F (37.3 °C) 87 17 181/119 Abnormal  140 98 % None (Room air) Lying   11/23/23 2015 -- 111 Abnormal  20 182/119 Abnormal  145 93 % None (Room air) Lying   11/23/23 1755 -- 100 20 204/116 Abnormal  151 95 % None (Room air) Sitting   11/23/23 1518 -- -- -- -- -- -- None (Ro      Pertinent Labs/Diagnostic Test Results:   11/23 EKG ECG rate:  95     ECG rate assessment: normal    Rhythm:     Rhythm: sinus rhythm    Ectopy:     Ectopy: none    QRS:     QRS axis:  Left     QRS intervals:   Wide   Conduction:     Conduction: abnormal      Abnormal conduction: non-specific intraventricular conduction delay    ST segments:     ST segments:  Normal   T waves:     T waves: flattening and inverted      Flattening:  I, aVL, V5 and V6     Inverted:  V2 and V3   CT head without contrast   Final Result by Brynn Lucero Han Kelley MD (11/23 1759)      No acute intracranial hemorrhage, midline shift, or mass effect. Chronic small vessel ischemic changes. Workstation performed: [id number]         CT abdomen pelvis with contrast   Final Result by Nicola Larios MD (11/23 1758)      1. Mild thickening of the esophagus and stomach may be due to esophagitis/gastritis. 2.  Redemonstrated extensive gaseous and stool distention of the colon similar to prior examination as described above. Stool-filled rectum, correlate for fecal impaction. 3.  Stable 2.2 cm indeterminate right adrenal nodule.          Workstation performed: [id number]         XR chest 1 view portable    (Results Pending)         Results from last 7 days   Lab Units 11/23/23  1554   WBC Thousand/uL 5.78   HEMOGLOBIN g/dL 12.9   HEMATOCRIT % 40.1   PLATELETS Thousands/uL 263   NEUTROS ABS Thousands/µL 3.58         Results from last 7 days   Lab Units 11/24/23  0458 11/23/23  1554   SODIUM mmol/L 145 149*   POTASSIUM mmol/L 3.3* 4.1   CHLORIDE mmol/L 110* 112*   CO2 mmol/L 29 30   ANION GAP mmol/L 6 7   BUN mg/dL 22 28*   CREATININE mg/dL 1.01 1.32*   EGFR ml/min/1.73sq m 52 37   CALCIUM mg/dL 9.5 10.4*   MAGNESIUM mg/dL  --  2.3     Results from last 7 days   Lab Units 11/23/23  1554   AST U/L 16   ALT U/L 7   ALK PHOS U/L 39   TOTAL PROTEIN g/dL 7.5   ALBUMIN g/dL 4.6   TOTAL BILIRUBIN mg/dL 0.70   BILIRUBIN DIRECT mg/dL 0.08     Results from last 7 days   Lab Units 11/23/23 2144 11/23/23  1539   POC GLUCOSE mg/dl 148* 83     Results from last 7 days   Lab Units 11/24/23  0458 11/23/23  1554   GLUCOSE RANDOM mg/dL 93 79       Results from last 7 days   Lab Units 11/23/23 2003 11/23/23  1752 11/23/23  1554   HS TNI 0HR ng/L  --   --  12   HS TNI 2HR ng/L  --  29  --    HSTNI D2 ng/L  --  17  --    HS TNI 4HR ng/L 43  --   --    HSTNI D4 ng/L 31*  --   --        Results from last 7 days   Lab Units 11/23/23  1554   TSH [address] [place] [address] uIU/mL 1.730 Results from last 7 days   Lab Units 11/23/23  1615   CLARITY UA  Clear   COLOR UA  Light Yellow   SPEC GRAV UA  1.013   PH UA  6.5   GLUCOSE UA mg/dl Negative   KETONES UA mg/dl Negative   BLOOD UA  Negative   PROTEIN UA mg/dl 30 (1+)*   NITRITE UA  Negative   BILIRUBIN UA  Negative   UROBILINOGEN UA (BE) mg/dl <2.0   LEUKOCYTES UA  Negative   WBC UA /hpf 1-2   RBC UA /hpf 1-2   BACTERIA UA /hpf None Seen   EPITHELIAL CELLS WET PREP /hpf None Seen     ED Treatment:   Medication Administration from 11/23/2023 1502 to 11/23/2023 2020         Date/Time Order Dose Route Action     11/23/2023 1640 EST lactated ringers bolus 1,000 mL 1,000 mL Intravenous New Bag     11/23/2023 2015 EST labetalol (NORMODYNE) injection 10 mg 10 mg Intravenous Given          Past Medical History:   Diagnosis Date    Acute kidney injury ([address]) 1/3/2022    Dementia ([address])     Hypertension      Present on Admission:   Hypertension   VERONICA (acute kidney injury) ([address])   Constipation   Dementia without behavioral disturbance ([address])      Admitting Diagnosis: Hypernatremia [E87.0]  Altered mental status [R41.82]  Hypertension [I10]  Unsteady gait [R26.81]  Encephalopathy [G93.40]  VERONIAC (acute kidney injury) ([address]) [N17.9]  History of dementia [Z86.59]  Age/Sex: 82 y.o. female  Admission Orders:  Scheduled Medications:  amLODIPine, 5 mg, Oral, Daily  heparin (porcine), 5,000 Units, Subcutaneous, Q8H [address]  pantoprazole, 40 mg, Intravenous, Q24H [address]  polyethylene glycol, 17 g, Oral, Daily  senna, 8.6 mg, Oral, HS      Continuous IV Infusions:  dextrose, 50 mL/hr, Intravenous, Continuous      PRN Meds:  acetaminophen, 650 mg, Oral, Q6H PRN  aluminum-magnesium hydroxide-simethicone, 30 mL, Oral, Q6H PRN  labetalol, 10 mg, Intravenous, Q4H PRN  melatonin, 3 mg, Oral, HS PRN    Amb pt   Cont pulse ox   Up and OOB   Tele         Network Utilization Review Department  ATTENTION: Please call with any questions or concerns to [phone] and carefully listen to the prompts so that you are directed to the right person. All voicemails are confidential.   For Discharge needs, contact Care Management DC Support Team at [phone] opt. 2  Send all requests for admission clinical reviews, approved or denied determinations and any other requests to dedicated fax number below belonging to the campus where the patient is receiving treatment.  List of dedicated fax numbers for the Facilities:  [place] DENIALS (Administrative/Medical Necessity) [phone]   DISCHARGE SUPPORT TEAM (NETWORK) [id number] (Maternity/NICU/Pediatrics) [phone]   [address] [place] [address] [address] [place] [address] [facility] [phone]   [address] [place] [address] [address] [address] [facility] [address] [facility] [address] [place], [place] [address]y Rd  [phone]

## 2023-11-24 NOTE — H&P
[address]  H&P  Name: Mary Jane Pastrana 82 y.o. female I MRN: [id number]  Unit/Bed#: -Rey I Date of Admission: 11/23/2023   Date of Service: 11/23/2023 I Hospital Day: 0      Assessment/Plan   * Acute metabolic encephalopathy  Assessment & Plan  Most likely in the setting of dehydration/gastritis and dementia  CT head negative, UA negative, chest x-ray without opacities/pneumonia noted  See plan under gastritis and VERONICA    Gastritis  Assessment & Plan  Patient mostly nonverbal at baseline per patient's son, but per son she appeared to be dry heaving today and in slight pain  CT abdomen pelvis revealing mild thickening of esophagus and stomach related to esophagitis and gastritis, no obstruction seen  Provide with gentle IV fluid hydration tonight  Start full liquid diet, toast and crackers, advance as tolerated  Start IV Protonix 40 mg daily    Hypernatremia  Assessment & Plan  Sodium slightly elevated at 149 with noted elevated chloride most likely in the setting of dehydration/dementia  Will initiate on gentle D5W IV fluids over the next 10 hours  Recheck BMP in a.m.     VERONICA (acute kidney injury) ([address])  Assessment & Plan  Creatinine currently 1.32, baseline around 0.8-0.9 most likely setting of dehydration/dementia  IV fluid hydration  Avoid nephrotoxic agents, monitor BMP    Dementia without behavioral disturbance [facility])  Assessment & Plan  Mostly nonverbal at baseline per patient's son  Fall precautions, delirium precautions with frequent verbal redirection encouraged    Hypertension  Assessment & Plan  Per patient's son her PCP discontinued her home lisinopril and amlodipine as her blood pressures have been very well-controlled at home  However, presenting with hypertension to 204/116 on arrival, possibly secondary to pain, dehydration  Given a dose of IV labetalol in the ED, will monitor and continue as needed IV labetalol with parameters  For now we will restart home p.o. amlodipine 5 mg daily as well, will need to follow-up with PCP    Constipation  Assessment & Plan  CT abdomen pelvis revealing redemonstrative gaseous distention and stool in colon similar to previous scan with also large amount of stool in rectum with possible impaction  Has been moving bowels at home per patient's son, but due to extensive fecal matter will initiate on senna nightly and MiraLAX daily           VTE Pharmacologic Prophylaxis: VTE Score: 3 Moderate Risk (Score 3-4) - Pharmacological DVT Prophylaxis Ordered: heparin. Code Status: Level 1 - Full Code per pt's son  Discussion with family: Updated  (son) at bedside. Anticipated Length of Stay: Patient will be admitted on an inpatient basis with an anticipated length of stay of greater than 2 midnights secondary to see above. Total Time Spent on Date of Encounter in care of patient: 70 mins. This time was spent on one or more of the following: performing physical exam; counseling and coordination of care; obtaining or reviewing history; documenting in the medical record; reviewing/ordering tests, medications or procedures; communicating with other healthcare professionals and discussing with patient's family/caregivers. Chief Complaint:    Chief Complaint   Patient presents with    Altered Mental Status     Pt. Presents to ER by EMS from son's home. Pt. Is nonverbal, pt. Son states "she woke up and was slower than normal. She ate a bowl of cereal,  she sat at the table and started like she was breathing hard and then maybe like she was in pain, she did vomit and then seemed like she couldn't breathe so I called 911."         History of Present Illness:  Ko Perez is a 82 y.o. female with a PMH of dementia, hypertension, spinal stenosis who presents with complaint of appearing more lethargic than normal and appeared to be dry heaving and possibly in pain per patient's son.   At baseline patient is mostly nonverbal secondary to dementia and during exam does not answer any questions. Per patient's son she woke up and ate a bowl of cereal and appeared in her normal state, but then started to appear as if she almost vomited and has been a little bit more lethargic than usual.  He reports she usually does not answer any questions in regards to pain so he is not sure if she is truly in pain, but she did appear to be holding her stomach at 1 point. .    Review of Systems:  Review of Systems   Unable to perform ROS: Dementia       Past Medical and Surgical History:   Past Medical History:   Diagnosis Date    Acute kidney injury ([address]) 1/3/2022    Dementia ([address])     Hypertension        History reviewed. No pertinent surgical history. Meds/Allergies:  Prior to Admission medications    Medication Sig Start Date End Date Taking? Authorizing Provider   amLODIPine (NORVASC) 5 mg tablet Take 1 tablet (5 mg total) by mouth daily  Patient not taking: Reported on 11/23/2023 10/8/21   KAREN Felder   lidocaine (LIDODERM) 5 % APPLY 1 PATCH TOPICALLY DAILY REMOVE & DISCARD PATCH WITHIN 12 HOURS OR AS DIRECTED BY MD 9/13/21 10/13/21  Ese Gonzalez DO   senna (SENOKOT) 8.6 mg Take 1 tablet (8.6 mg total) by mouth daily  Patient not taking: Reported on 11/23/2023 1/5/22   KAREN Shipley   docusate sodium (COLACE) 100 mg capsule Take 1 capsule (100 mg total) by mouth 2 (two) times a day  Patient not taking: Reported on 11/23/2023 1/4/22 11/23/23  KAREN Shipley   lisinopril (ZESTRIL) 20 mg tablet TAKE 1 TABLET BY MOUTH EVERY DAY  Patient not taking: Reported on 11/23/2023 6/22/22 11/23/23  Ese Gonzalez DO   potassium chloride (K-DUR,KLOR-CON) 20 mEq tablet Take 1 tablet (20 mEq total) by mouth 2 (two) times a day  Patient not taking: Reported on 11/23/2023 3/21/22 11/23/23  Gideon Maya MD     I have reviewed her medications per review of chart.      Allergies: No Known Allergies    Social History:  Marital Status:      Patient Pre-hospital Living Situation: Home  Patient Pre-hospital Level of Mobility: unable to be assessed at time of evaluation  Patient Pre-hospital Diet Restrictions: dental soft, but no other restrictions per pt's son  Substance Use History:   Social History     Substance and Sexual Activity   Alcohol Use Never     Social History     Tobacco Use   Smoking Status Never   Smokeless Tobacco Never     Social History     Substance and Sexual Activity   Drug Use Never       Family History:  Family History   Problem Relation Age of Onset    No Known Problems Mother     No Known Problems Father        Physical Exam:     Vitals:   Blood Pressure: (!) 181/119 (11/23/23 2031)  Pulse: 87 (11/23/23 2031)  Temperature: 99.2 °F (37.3 °C) (11/23/23 2031)  Temp Source: Oral (11/23/23 2031)  Respirations: 17 (11/23/23 2031)  Height: 5' 5" (165.1 cm) (11/23/23 2031)  Weight - Scale: 50.5 kg (111 lb 5.3 oz) (11/23/23 2031)  SpO2: 98 % (11/23/23 2031)    Physical Exam  Vitals and nursing note reviewed. Constitutional:       General: She is not in acute distress. Appearance: Normal appearance. She is not diaphoretic. HENT:      Head: Normocephalic. Cardiovascular:      Rate and Rhythm: Regular rhythm. Tachycardia present. Pulmonary:      Effort: Pulmonary effort is normal. No respiratory distress. Breath sounds: Normal breath sounds. No stridor. No wheezing or rales. Abdominal:      General: Abdomen is flat. Bowel sounds are normal. There is no distension. Palpations: Abdomen is soft. Tenderness: There is no abdominal tenderness. There is no guarding. Musculoskeletal:      Right lower leg: No edema. Left lower leg: No edema. Skin:     General: Skin is warm and dry. Neurological:      Mental Status: She is alert.       Comments: Unable to fully examine as patient not answering any questions, mostly nonverbal at baseline per patient's son and currently appears at her baseline per patient's son   Psychiatric:      Comments: Unable to examine due to patient being nonverbal          Additional Data:     Lab Results:  Results from last 7 days   Lab Units 11/23/23  1554   WBC Thousand/uL 5.78   HEMOGLOBIN g/dL 12.9   HEMATOCRIT % 40.1   PLATELETS Thousands/uL 263   NEUTROS PCT % 62   LYMPHS PCT % 27   MONOS PCT % 8   EOS PCT % 2     Results from last 7 days   Lab Units 11/23/23  1554   SODIUM mmol/L 149*   POTASSIUM mmol/L 4.1   CHLORIDE mmol/L 112*   CO2 mmol/L 30   BUN mg/dL 28*   CREATININE mg/dL 1.32*   ANION GAP mmol/L 7   CALCIUM mg/dL 10.4*   ALBUMIN g/dL 4.6   TOTAL BILIRUBIN mg/dL 0.70   ALK PHOS U/L 39   ALT U/L 7   AST U/L 16   GLUCOSE RANDOM mg/dL 79         Results from last 7 days   Lab Units 11/23/23  1539   POC GLUCOSE mg/dl 83               Lines/Drains:  Invasive Devices       Peripheral Intravenous Line  Duration             Peripheral IV 11/23/23 Left Forearm <1 day                        Imaging: Reviewed radiology reports from this admission including: abdominal/pelvic CT and CT head and Personally reviewed the following imaging: chest xray  CT head without contrast   Final Result by Nicola Larios MD (11/23 1759)      No acute intracranial hemorrhage, midline shift, or mass effect. Chronic small vessel ischemic changes. Workstation performed: [id number]         CT abdomen pelvis with contrast   Final Result by Nicola Larios MD (11/23 1758)      1. Mild thickening of the esophagus and stomach may be due to esophagitis/gastritis. 2.  Redemonstrated extensive gaseous and stool distention of the colon similar to prior examination as described above. Stool-filled rectum, correlate for fecal impaction. 3.  Stable 2.2 cm indeterminate right adrenal nodule.          Workstation performed: [id number]         XR chest 1 view portable    (Results Pending)       EKG and Other Studies Reviewed on Admission:   EKG:  Prolonged QTc, sinus tachycardia, left anterior fascicular block, left axis deviation. ** Please Note: This note has been constructed using a voice recognition system.  **

## 2023-11-24 NOTE — ASSESSMENT & PLAN NOTE
Mostly nonverbal at baseline per patient's son  Fall precautions, delirium precautions with frequent verbal redirection encouraged

## 2023-11-24 NOTE — ASSESSMENT & PLAN NOTE
CT abdomen pelvis revealing mild thickening of esophagus and stomach related to esophagitis and gastritis, no obstruction seen  Continue PPI for 2 weeks as an outpatient

## 2023-11-24 NOTE — PLAN OF CARE
Problem: PAIN - ADULT  Goal: Verbalizes/displays adequate comfort level or baseline comfort level  Description: Interventions:  - Encourage patient to monitor pain and request assistance  - Assess pain using appropriate pain scale  - Administer analgesics based on type and severity of pain and evaluate response  - Implement non-pharmacological measures as appropriate and evaluate response  - Consider cultural and social influences on pain and pain management  - Notify physician/advanced practitioner if interventions unsuccessful or patient reports new pain  Outcome: Progressing     Problem: INFECTION - ADULT  Goal: Absence or prevention of progression during hospitalization  Description: INTERVENTIONS:  - Assess and monitor for signs and symptoms of infection  - Monitor lab/diagnostic results  - Monitor all insertion sites, i.e. indwelling lines, tubes, and drains  - Monitor endotracheal if appropriate and nasal secretions for changes in amount and color  - [place] appropriate cooling/warming therapies per order  - Administer medications as ordered  - Instruct and encourage patient and family to use good hand hygiene technique  - Identify and instruct in appropriate isolation precautions for identified infection/condition  Outcome: Progressing  Goal: Absence of fever/infection during neutropenic period  Description: INTERVENTIONS:  - Monitor WBC    Outcome: Progressing     Problem: SAFETY ADULT  Goal: Patient will remain free of falls  Description: INTERVENTIONS:  - Educate patient/family on patient safety including physical limitations  - Instruct patient to call for assistance with activity   - Consult OT/PT to assist with strengthening/mobility   - Keep Call bell within reach  - Keep bed low and locked with side rails adjusted as appropriate  - Keep care items and personal belongings within reach  - Initiate and maintain comfort rounds  - Make Fall Risk Sign visible to staff  - Apply yellow socks and bracelet for high fall risk patients  - Consider moving patient to room near nurses station  Outcome: Progressing  Goal: Maintain or return to baseline ADL function  Description: INTERVENTIONS:  -  Assess patient's ability to carry out ADLs; assess patient's baseline for ADL function and identify physical deficits which impact ability to perform ADLs (bathing, care of mouth/teeth, toileting, grooming, dressing, etc.)  - Assess/evaluate cause of self-care deficits   - Assess range of motion  - Assess patient's mobility; develop plan if impaired  - Assess patient's need for assistive devices and provide as appropriate  - Encourage maximum independence but intervene and supervise when necessary  - Involve family in performance of ADLs  - Assess for home care needs following discharge   - Consider OT consult to assist with ADL evaluation and planning for discharge  - Provide patient education as appropriate  Outcome: Progressing  Goal: Maintains/Returns to pre admission functional level  Description: INTERVENTIONS:  - Perform AM-PAC 6 Click Basic Mobility/ Daily Activity assessment daily.  - Set and communicate daily mobility goal to care team and patient/family/caregiver.    - Collaborate with rehabilitation services on mobility goals if consulted  - Out of bed for toileting  - Record patient progress and toleration of activity level   Outcome: Progressing     Problem: DISCHARGE PLANNING  Goal: Discharge to home or other facility with appropriate resources  Description: INTERVENTIONS:  - Identify barriers to discharge w/patient and caregiver  - Arrange for needed discharge resources and transportation as appropriate  - Identify discharge learning needs (meds, wound care, etc.)  - Arrange for interpretive services to assist at discharge as needed  - Refer to Case Management Department for coordinating discharge planning if the patient needs post-hospital services based on physician/advanced practitioner order or complex needs related to functional status, cognitive ability, or social support system  Outcome: Progressing     Problem: Knowledge Deficit  Goal: Patient/family/caregiver demonstrates understanding of disease process, treatment plan, medications, and discharge instructions  Description: Complete learning assessment and assess knowledge base.   Interventions:  - Provide teaching at level of understanding  - Provide teaching via preferred learning methods  Outcome: Progressing

## 2023-11-24 NOTE — ASSESSMENT & PLAN NOTE
Per patient's son her PCP discontinued her home lisinopril and amlodipine as her blood pressures have been very well-controlled at home  However, presenting with hypertension to 204/116 on arrival, possibly secondary to pain, dehydration  Given a dose of IV labetalol in the ED, will monitor and continue as needed IV labetalol with parameters  For now we will restart home p.o. amlodipine 5 mg daily as well, will need to follow-up with PCP

## 2023-11-24 NOTE — PLAN OF CARE
Problem: PAIN - ADULT  Goal: Verbalizes/displays adequate comfort level or baseline comfort level  Description: Interventions:  - Encourage patient to monitor pain and request assistance  - Assess pain using appropriate pain scale  - Administer analgesics based on type and severity of pain and evaluate response  - Implement non-pharmacological measures as appropriate and evaluate response  - Consider cultural and social influences on pain and pain management  - Notify physician/advanced practitioner if interventions unsuccessful or patient reports new pain  Outcome: Progressing     Problem: INFECTION - ADULT  Goal: Absence or prevention of progression during hospitalization  Description: INTERVENTIONS:  - Assess and monitor for signs and symptoms of infection  - Monitor lab/diagnostic results  - Monitor all insertion sites, i.e. indwelling lines, tubes, and drains  - Monitor endotracheal if appropriate and nasal secretions for changes in amount and color  - [place] appropriate cooling/warming therapies per order  - Administer medications as ordered  - Instruct and encourage patient and family to use good hand hygiene technique  - Identify and instruct in appropriate isolation precautions for identified infection/condition  Outcome: Progressing     Problem: Knowledge Deficit  Goal: Patient/family/caregiver demonstrates understanding of disease process, treatment plan, medications, and discharge instructions  Description: Complete learning assessment and assess knowledge base.   Interventions:  - Provide teaching at level of understanding  - Provide teaching via preferred learning methods  Outcome: Progressing

## 2023-11-24 NOTE — ED PROVIDER NOTES
History  Chief Complaint   Patient presents with    Altered Mental Status     Pt. Presents to ER by EMS from son's home. Pt. Is nonverbal, pt. Son states "she woke up and was slower than normal. She ate a bowl of cereal,  she sat at the table and started like she was breathing hard and then maybe like she was in pain, she did vomit and then seemed like she couldn't breathe so I called 911."        Altered Mental Status      Prior to Admission Medications   Prescriptions Last Dose Informant Patient Reported? Taking? amLODIPine (NORVASC) 5 mg tablet   No No   Sig: Take 1 tablet (5 mg total) by mouth daily   docusate sodium (COLACE) 100 mg capsule   No No   Sig: Take 1 capsule (100 mg total) by mouth 2 (two) times a day   lidocaine (LIDODERM) 5 %   No No   Sig: APPLY 1 PATCH TOPICALLY DAILY REMOVE & DISCARD PATCH WITHIN 12 HOURS OR AS DIRECTED BY MD   lisinopril (ZESTRIL) 20 mg tablet   No No   Sig: TAKE 1 TABLET BY MOUTH EVERY DAY   potassium chloride (K-DUR,KLOR-CON) 20 mEq tablet   No No   Sig: Take 1 tablet (20 mEq total) by mouth 2 (two) times a day   senna (SENOKOT) 8.6 mg   No No   Sig: Take 1 tablet (8.6 mg total) by mouth daily      Facility-Administered Medications: None       Past Medical History:   Diagnosis Date    Acute kidney injury ([address]) 1/3/2022    Dementia ([address])     Hypertension        History reviewed. No pertinent surgical history. Family History   Problem Relation Age of Onset    No Known Problems Mother     No Known Problems Father      I have reviewed and agree with the history as documented.     E-Cigarette/Vaping    E-Cigarette Use Never User      E-Cigarette/Vaping Substances    Nicotine No     THC No     CBD No     Flavoring No     Other No     Unknown No      Social History     Tobacco Use    Smoking status: Never    Smokeless tobacco: Never   Vaping Use    Vaping Use: Never used   Substance Use Topics    Alcohol use: Never    Drug use: Never       Review of Systems    Physical Exam  Physical Exam  Vitals and nursing note reviewed. Constitutional:       General: She is not in acute distress. Appearance: She is well-developed. Comments: hypertensive   HENT:      Head: Normocephalic and atraumatic. Eyes:      Extraocular Movements: Extraocular movements intact. Conjunctiva/sclera: Conjunctivae normal.      Pupils: Pupils are equal, round, and reactive to light. Neck:      Trachea: No tracheal deviation. Cardiovascular:      Rate and Rhythm: Normal rate and regular rhythm. Heart sounds: Normal heart sounds. Pulmonary:      Effort: Pulmonary effort is normal. No respiratory distress. Breath sounds: Normal breath sounds. Abdominal:      General: A surgical scar is present. There is distension. Palpations: Abdomen is soft. Tenderness: There is no abdominal tenderness. Musculoskeletal:      Cervical back: Normal range of motion. Skin:     General: Skin is warm and dry. Neurological:      Mental Status: She is alert. Mental status is at baseline. GCS: GCS eye subscore is 4. GCS verbal subscore is 5. GCS motor subscore is 6. Cranial Nerves: No facial asymmetry. Motor: Weakness (mild, generalized) present.       Comments: Non verbal. Mild generalized weakness, no focal deficits   Psychiatric:         Mood and Affect: Mood and affect normal.         Behavior: Behavior normal.         Vital Signs  ED Triage Vitals [11/23/23 1513]   Temperature Pulse Respirations Blood Pressure SpO2   98 °F (36.7 °C) 95 18 (!) 171/103 95 %      Temp Source Heart Rate Source Patient Position - Orthostatic VS BP Location FiO2 (%)   Oral Monitor Sitting Right arm --      Pain Score       --           Vitals:    11/23/23 1513 11/23/23 1755   BP: (!) 171/103 (!) 204/116   Pulse: 95 100   Patient Position - Orthostatic VS: Sitting Sitting         Visual Acuity  Visual Acuity      Flowsheet Row Most Recent Value   L Pupil Size (mm) 3   R Pupil Size (mm) 3 ED Medications  Medications   labetalol (NORMODYNE) injection 10 mg (has no administration in time range)   lactated ringers bolus 1,000 mL (0 mL Intravenous Stopped 11/23/23 1752)   iohexol (OMNIPAQUE) 350 MG/ML injection (MULTI-DOSE) 100 mL (100 mL Intravenous Given 11/23/23 1633)       Diagnostic Studies  Results Reviewed       Procedure Component Value Units Date/Time    HS Troponin I 2hr [[id number]]  (Normal) Collected: 11/23/23 1752    Lab Status: Final result Specimen: Blood from Arm, Left Updated: 11/23/23 1826     hs TnI 2hr 29 ng/L      Delta 2hr hsTnI 17 ng/L     Urine Microscopic [[id number]]  (Abnormal) Collected: 11/23/23 1615    Lab Status: Final result Specimen: Urine, Straight Cath Updated: 11/23/23 1643     RBC, UA 1-2 /hpf      WBC, UA 1-2 /hpf      Epithelial Cells None Seen /hpf      Bacteria, UA None Seen /hpf      Hyaline Casts, UA 0-3 /lpf     UA w Reflex to Microscopic w Reflex to Culture [[id number]]  (Abnormal) Collected: 11/23/23 1615    Lab Status: Final result Specimen: Urine, Straight Cath Updated: 11/23/23 1639     Color, UA Light Yellow     Clarity, UA Clear     Specific Gravity, UA 1.013     pH, UA 6.5     Leukocytes, UA Negative     Nitrite, UA Negative     Protein, UA 30 (1+) mg/dl      Glucose, UA Negative mg/dl      Ketones, UA Negative mg/dl      Urobilinogen, UA <2.0 mg/dl      Bilirubin, UA Negative     Occult Blood, UA Negative    TSH, 3rd generation with Free T4 reflex [[id number]]  (Normal) Collected: 11/23/23 1554    Lab Status: Final result Specimen: Blood from Arm, Left Updated: 11/23/23 1637     TSH 3RD GENERATON 1.730 uIU/mL     HS Troponin I 4hr [[id number]]     Lab Status: No result Specimen: Blood     HS Troponin 0hr (reflex protocol) [[id number]]  (Normal) Collected: 11/23/23 1554    Lab Status: Final result Specimen: Blood from Arm, Left Updated: 11/23/23 1627     hs TnI 0hr 12 ng/L     Hepatic function panel [[id number]]  (Normal) Collected: 11/23/23 1554    Lab Status: Final result Specimen: Blood from Arm, Left Updated: 11/23/23 1619     Total Bilirubin 0.70 mg/dL      Bilirubin, Direct 0.08 mg/dL      Alkaline Phosphatase 39 U/L      AST 16 U/L      ALT 7 U/L      Total Protein 7.5 g/dL      Albumin 4.6 g/dL     Basic metabolic panel [[id number]]  (Abnormal) Collected: 11/23/23 1554    Lab Status: Final result Specimen: Blood from Arm, Left Updated: 11/23/23 1619     Sodium 149 mmol/L      Potassium 4.1 mmol/L      Chloride 112 mmol/L      CO2 30 mmol/L      ANION GAP 7 mmol/L      BUN 28 mg/dL      Creatinine 1.32 mg/dL      Glucose 79 mg/dL      Calcium 10.4 mg/dL      eGFR 37 ml/min/1.73sq m     Narrative:      [place] guidelines for Chronic Kidney Disease (CKD):     Stage 1 with normal or high GFR (GFR > 90 mL/min/1.73 square meters)    Stage 2 Mild CKD (GFR = 60-89 mL/min/1.73 square meters)    Stage 3A Moderate CKD (GFR = 45-59 mL/min/1.73 square meters)    Stage 3B Moderate CKD (GFR = 30-44 mL/min/1.73 square meters)    Stage 4 Severe CKD (GFR = 15-29 mL/min/1.73 square meters)    Stage 5 End Stage CKD (GFR <15 mL/min/1.73 square meters)  Note: GFR calculation is accurate only with a steady state creatinine    Magnesium [[id number]]  (Normal) Collected: 11/23/23 1554    Lab Status: Final result Specimen: Blood from Arm, Left Updated: 11/23/23 1619     Magnesium 2.3 mg/dL     CBC and differential [[id number]] Collected: 11/23/23 1554    Lab Status: Final result Specimen: Blood from Arm, Left Updated: 11/23/23 1600     WBC 5.78 Thousand/uL      RBC 4.63 Million/uL      Hemoglobin 12.9 g/dL      Hematocrit 40.1 %      MCV 87 fL      MCH 27.9 pg      MCHC 32.2 g/dL      RDW 14.0 %      MPV 10.6 fL      Platelets 525 Thousands/uL      nRBC 0 /100 WBCs      Neutrophils Relative 62 %      Immat GRANS % 0 %      Lymphocytes Relative 27 %      Monocytes Relative 8 %      Eosinophils Relative 2 %      Basophils Relative 1 %      Neutrophils Absolute 3.58 Thousands/µL      Immature Grans Absolute 0.01 Thousand/uL      Lymphocytes Absolute 1.55 Thousands/µL      Monocytes Absolute 0.47 Thousand/µL      Eosinophils Absolute 0.12 Thousand/µL      Basophils Absolute 0.05 Thousands/µL     Fingerstick Glucose (POCT) [[id number]]  (Normal) Collected: 11/23/23 1539    Lab Status: Final result Updated: 11/23/23 1540     POC Glucose 83 mg/dl                    CT head without contrast   Final Result by Kip Patten MD (11/23 1759)      No acute intracranial hemorrhage, midline shift, or mass effect. Chronic small vessel ischemic changes. Workstation performed: [id number]         CT abdomen pelvis with contrast   Final Result by Kip Patten MD (11/23 1758)      1. Mild thickening of the esophagus and stomach may be due to esophagitis/gastritis. 2.  Redemonstrated extensive gaseous and stool distention of the colon similar to prior examination as described above. Stool-filled rectum, correlate for fecal impaction. 3.  Stable 2.2 cm indeterminate right adrenal nodule. Workstation performed: [id number]         XR chest 1 view portable    (Results Pending)              Procedures  ECG 12 Lead Documentation Only    Date/Time: 11/23/2023 7:19 PM    Performed by: Stevenson Ruiz MD  Authorized by: Stevenson Ruiz MD    Indications / Diagnosis:  AMS  ECG reviewed by me, the ED Provider: yes    Patient location:  ED  Previous ECG:     Previous ECG:  Compared to current    Comparison ECG info:  03/21/22  Interpretation:     Interpretation: abnormal    Rate:     ECG rate:  95    ECG rate assessment: normal    Rhythm:     Rhythm: sinus rhythm    Ectopy:     Ectopy: none    QRS:     QRS axis:  Left    QRS intervals:   Wide  Conduction:     Conduction: abnormal      Abnormal conduction: non-specific intraventricular conduction delay    ST segments:     ST segments:  Normal  T waves:     T waves: flattening and inverted Flattening:  I, aVL, V5 and V6    Inverted:  V2 and V3           ED Course               Identification of Seniors at Risk      Flowsheet Row Most Recent Value   (ISAR) Identification of Seniors at Risk    Before the illness or injury that brought you to the Emergency, did you need someone to help you on a regular basis? 1 Filed at: 11/23/2023 1515   In the last 24 hours, have you needed more help than usual? 1 Filed at: 11/23/2023 1515   Have you been hospitalized for one or more nights during the past 6 months? 1 Filed at: 11/23/2023 1515   In general, do you see well? 1 Filed at: 11/23/2023 1515   In general, do you have serious problems with your memory? 1 Filed at: 11/23/2023 1515   Do you take more than three different medications every day? 0 Filed at: 11/23/2023 1515   ISAR Score 5 Filed at: 11/23/2023 1515                        SBIRT 22yo+      Flowsheet Row Most Recent Value   Initial Alcohol Screen: US AUDIT-C     1. How often do you have a drink containing alcohol? 0 Filed at: 11/23/2023 1515   2. How many drinks containing alcohol do you have on a typical day you are drinking? 0 Filed at: 11/23/2023 1515   3b. FEMALE Any Age, or MALE 65+: How often do you have 4 or more drinks on one occassion? 0 Filed at: 11/23/2023 1515   Audit-C Score 0 Filed at: 11/23/2023 1515   [place]: How many times in the past year have you. .. Used an illegal drug or used a prescription medication for non-medical reasons? Never Filed at: 11/23/2023 1515                      Medical Decision Making  This is an 72-year-old female who presents here today with son for altered mental status. She woke up today "lethargic" and then had an episode of vomiting and choking, prompting him to call 911. He describes it as being like she was in slow motion, moving and walking very slowly compared to normal.  She has baseline dementia and is nonverbal but was not really talking at all today.   He says she usually responds to people when they are talking to her but seems to have to repeat things several times to get her to respond and act appropriately. She has not had any recent falls or trauma. She ate breakfast than normal today. He was outside of the room and heard her making funny noises, and that she looked like she was dry heaving and choking. She was holding her upper stomach and moaning. She has no preceding fevers or URI symptoms, other nausea or vomiting. She has been moving her bowels normally. She has no recent urinary symptoms. She is usually incontinent of both stool and urine but this is unchanged from baseline. She takes no daily medications. He says she has a history of hypertension but was told it was well-controlled so has not been taking anything for it for a while. He does check it intermittently at home and says it has been doing well recently. She did have a problem with her colon before, and had several feet removed. She had history of recurrent sigmoid volvulus, and had sigmoid resection. She had a PCP note 10/21/2023 with a blood pressure of 144/94, and at that time daughter said only was taking blood pressure "as needed" and was advised to take it daily. Review of systems: Otherwise negative unless stated as above    She is well-appearing, no acute distress. She has no abdominal tenderness. She is currently not speaking but is responding to commands, without focal neurologic deficits. Exam is otherwise unremarkable. Differential for symptoms does include possible infection, dehydration, VERONICA, ACS, dysrhythmia, recurrent volvulus, small bowel obstruction, GI illness, less likely colitis or enteritis given lack of diarrhea or abdominal tenderness. Will check lab work and CT scan to evaluate, and treat symptoms. CT scan of the abdomen pelvis shows possible esophagitis/gastritis. She has extensive gaseous and stool distention of the colon similar to prior without signs of obstruction.   Lab work shows creatinine of 1.32 up from baseline of around 0.9, with hypernatremia of 149 and hyperchloremia of 112. BUN/creatinine ratio was greater than 20, suggestive of prerenal VERONICA. Urine shows 1+ protein but no signs of infection. Initial troponin was 12, and delta was 29. This could be raised in the setting of VERONICA, however needs to be trended to peak. She will be admitted for further evaluation and management of symptoms. I updated patient and son on findings and plan of care, and they expressed understanding. Problems Addressed:  VERONICA (acute kidney injury) ([address]): acute illness or injury  Encephalopathy: acute illness or injury  History of dementia: chronic illness or injury  Hypernatremia: acute illness or injury  Hypertension: chronic illness or injury with exacerbation, progression, or side effects of treatment  Unsteady gait: acute illness or injury    Amount and/or Complexity of Data Reviewed  Independent Historian:      Details: son  External Data Reviewed: labs and notes. Labs: ordered. Decision-making details documented in ED Course. Radiology: ordered and independent interpretation performed. Decision-making details documented in ED Course. ECG/medicine tests: ordered and independent interpretation performed. Decision-making details documented in ED Course. Risk  Prescription drug management. Decision regarding hospitalization. Disposition  Final diagnoses:   None     ED Disposition       ED Disposition   Admit    Condition   Stable    Date/Time   Thu Nov 23, 2023 1815    Comment   Case was discussed with Dr Jessica Navas and the patient's admission status was agreed to be Admission Status: observation status to the service of Dr. Jessica Navas . Follow-up Information    None         Patient's Medications   Discharge Prescriptions    No medications on file       No discharge procedures on file.     PDMP Review         Value Time User    PDMP Reviewed  Yes 9/28/2020 12:30 PM Odilia Galvan PA-C            ED Provider  Electronically Signed by             Yeimy Wilson MD  11/23/23 [id number]

## 2023-11-24 NOTE — ASSESSMENT & PLAN NOTE
Most likely in the setting of dehydration/gastritis and dementia  CT head negative, UA negative, chest x-ray without opacities/pneumonia noted  See plan under gastritis and VERONICA

## 2023-11-24 NOTE — DISCHARGE SUMMARY
[address]  Discharge- Reinier Johnson 1940, 82 y.o. female MRN: [id number]  Unit/Bed#: -[facility] Encounter: [id number]  Primary Care Provider: No primary care provider on file.    Date and time admitted to hospital: 11/23/2023  3:02 PM    * Acute metabolic encephalopathy-resolved as of 11/24/2023  Assessment & Plan  Most likely in the setting of dehydration/gastritis and dementia  CT head negative, UA negative, chest x-ray without opacities/pneumonia noted  See plan under gastritis and VERONICA    Gastritis  Assessment & Plan  CT abdomen pelvis revealing mild thickening of esophagus and stomach related to esophagitis and gastritis, no obstruction seen  Continue PPI for 2 weeks as an outpatient     Dementia without behavioral disturbance [facility])  Assessment & Plan  Mostly nonverbal at baseline per patient's son  Fall precautions, delirium precautions with frequent verbal redirection encouraged    Hypertension  Assessment & Plan  Per patient's son her PCP discontinued her home lisinopril and amlodipine as her blood pressures have been very well-controlled at home  However, presenting with hypertension to 204/116 on arrival, possibly secondary to pain, dehydration  Given a dose of IV labetalol in the ED, will monitor and continue as needed IV labetalol with parameters  For now we will restart home p.o. amlodipine 5 mg daily as well, will need to follow-up with PCP    Constipation  Assessment & Plan  CT abdomen pelvis revealing redemonstrative gaseous distention and stool in colon similar to previous scan with also large amount of stool in rectum with possible impaction  Has been moving bowels at home per patient's son, but due to extensive fecal matter will initiate on senna nightly     VERONICA (acute kidney injury) (HCC)-resolved as of 11/24/2023  Assessment & Plan  Creatinine currently 1.32, baseline around 0.8-0.9 most likely setting of dehydration/dementia  IV fluid hydration  Avoid nephrotoxic agents, monitor BMP      Medical Problems       Resolved Problems  Date Reviewed: 11/23/2023            Resolved    VERONICA (acute kidney injury) ([address]) 11/24/2023     Resolved by  Steven Morton MD    * (Principal) Acute metabolic encephalopathy 62/32/9503     Resolved by  Steven Morton MD    Hypernatremia 11/24/2023     Resolved by  Steven Morton MD        Discharging Physician / Practitioner: Steven Morton MD  PCP: No primary care provider on file. Admission Date:   Admission Orders (From admission, onward)       Ordered        11/23/23 1950  INPATIENT ADMISSION  Once                          Discharge Date: 11/24/23    Consultations During Hospital Stay:  None    Procedures Performed:   None    Significant Findings / Test Results:   CT head: No acute intracranial hemorrhage, midline shift, or mass effect. Chronic small vessel ischemic changes. CT Abdomen: 1. Mild thickening of the esophagus and stomach may be due to esophagitis/gastritis. 2.  Redemonstrated extensive gaseous and stool distention of the colon similar to prior examination as described above. Stool-filled rectum, correlate for fecal impaction. 3.  Stable 2.2 cm indeterminate right adrenal nodule. Incidental Findings:   No new findings     Test Results Pending at Discharge (will require follow up): None     Outpatient Tests Requested:  Repeat BMP in 1 week     Complications:  None    Reason for Admission: Altered mentation    Hospital Course:   Thalia Canales is a 82 y.o. female patient who originally presented to the hospital on 11/23/2023 due to altered mentation, nausea. CT abdomen showed esophagitis/gastritis. CT head was negative. Labs were significant for hypernatremia and VERONICA. She was given IV hydration overnight with resolution of both lab issues. Her mental status returned to baseline by the morning per her son.  She was discharged home on omeprazole 40mg BID for 2 weeks and will need to follow up with her PCP to ensure resolution of her symptoms. The patient, initially admitted to the hospital as inpatient, was discharged earlier than expected given the following: rapid improvement in creatinine and mental status. Please see above list of diagnoses and related plan for additional information. Condition at Discharge: good    Discharge Day Visit / Exam:   Subjective:  Patient is minimally verbal at baseline. She nodded "no" when asked if she was in pain or nauseous. Per son, this is her baseline. Vitals: Blood Pressure: 164/99 (11/24/23 0808)  Pulse: 76 (11/24/23 0807)  Temperature: 97.6 °F (36.4 °C) (11/24/23 0808)  Temp Source: Oral (11/23/23 2031)  Respirations: 17 (11/23/23 2031)  Height: 5' 5" (165.1 cm) (11/23/23 2031)  Weight - Scale: 50.5 kg (111 lb 5.3 oz) (11/23/23 2031)  SpO2: 99 % (11/24/23 1100)  Exam:   Physical Exam  Vitals and nursing note reviewed. Constitutional:       Appearance: Normal appearance. HENT:      Head: Normocephalic and atraumatic. Mouth/Throat:      Mouth: Mucous membranes are moist.   Eyes:      Conjunctiva/sclera: Conjunctivae normal.      Pupils: Pupils are equal, round, and reactive to light. Cardiovascular:      Rate and Rhythm: Normal rate and regular rhythm. Pulses: Normal pulses. Heart sounds: Normal heart sounds. No murmur heard. No gallop. Pulmonary:      Effort: Pulmonary effort is normal. No respiratory distress. Breath sounds: Normal breath sounds. No wheezing or rales. Abdominal:      General: Abdomen is flat. Bowel sounds are normal. There is no distension. Palpations: Abdomen is soft. Tenderness: There is no abdominal tenderness. There is no guarding or rebound. Musculoskeletal:         General: No swelling. Normal range of motion. Skin:     General: Skin is warm and dry. Capillary Refill: Capillary refill takes less than 2 seconds. Neurological:      Mental Status: She is alert.  Mental status is at baseline. Discussion with Family: Updated  (son and daughter) at bedside. Discharge instructions/Information to patient and family:   See after visit summary for information provided to patient and family. Provisions for Follow-Up Care:  See after visit summary for information related to follow-up care and any pertinent home health orders. Mobility at time of Discharge:   Basic Mobility Inpatient Raw Score: 22  JH-HLM Goal: 7: Walk 25 feet or more  JH-HL Achieved: 1: Laying in bed  [facility] Goal NOT achieved. Continue to encourage mobility in post discharge setting. Disposition:   Home    Planned Readmission: None     Discharge Statement:  I spent 60 minutes discharging the patient. This time was spent on the day of discharge. I had direct contact with the patient on the day of discharge. Greater than 50% of the total time was spent examining patient, answering all patient questions, arranging and discussing plan of care with patient as well as directly providing post-discharge instructions. Additional time then spent on discharge activities. Discharge Medications:  See after visit summary for reconciled discharge medications provided to patient and/or family.       **Please Note: This note may have been constructed using a voice recognition system**

## 2023-11-24 NOTE — ASSESSMENT & PLAN NOTE
CT abdomen pelvis revealing redemonstrative gaseous distention and stool in colon similar to previous scan with also large amount of stool in rectum with possible impaction  Has been moving bowels at home per patient's son, but due to extensive fecal matter will initiate on senna nightly and MiraLAX daily

## 2023-11-24 NOTE — ASSESSMENT & PLAN NOTE
Creatinine currently 1.32, baseline around 0.8-0.9 most likely setting of dehydration/dementia  IV fluid hydration  Avoid nephrotoxic agents, monitor BMP

## 2023-11-24 NOTE — ASSESSMENT & PLAN NOTE
Sodium slightly elevated at 149 with noted elevated chloride most likely in the setting of dehydration/dementia  Will initiate on gentle D5W IV fluids over the next 10 hours  Recheck BMP in a.m.

## 2023-11-24 NOTE — DISCHARGE INSTR - AVS FIRST PAGE
Take omeprazole twice daily for 14 days. Follow up with your PCP within 1-2 weeks. If nausea/vomiting/poor appetite do not resolve, please return to the ER for further care.

## 2023-11-24 NOTE — ASSESSMENT & PLAN NOTE
Patient mostly nonverbal at baseline per patient's son, but per son she appeared to be dry heaving today and in slight pain  CT abdomen pelvis revealing mild thickening of esophagus and stomach related to esophagitis and gastritis, no obstruction seen  Provide with gentle IV fluid hydration tonight  Start full liquid diet, toast and crackers, advance as tolerated  Start IV Protonix 40 mg daily

## 2023-11-24 NOTE — NURSING NOTE
Per ER nurse's note, pt's family refusing COVID/FLU/RSV swab at this time. Because of outstanding test, pt comes up as COVID-19 suspected in Epic with a pop up asking to order isolation precautions. Contacted admitting Tanisha Menezes asking if isolation precautions are necessary as the pt is asymptomatic. Per admitting, isolation precautions are not necessary. No further orders or pt complaints at this time.

## 2023-11-24 NOTE — ED NOTES
Unable to obtain IV access after multiple RN's attempting, Provider aware and to attempt.       Lynne Cortés, JULIAN  11/23/23 1920

## 2023-11-24 NOTE — ASSESSMENT & PLAN NOTE
CT abdomen pelvis revealing redemonstrative gaseous distention and stool in colon similar to previous scan with also large amount of stool in rectum with possible impaction  Has been moving bowels at home per patient's son, but due to extensive fecal matter will initiate on senna nightly

## 2023-11-24 NOTE — ED NOTES
Multiple attempts made at gaining peripheral IV access failed at this time. Provider made aware.      Carmencita Kulkarni, RN  11/23/23 100 Doctor Stevie Garg, RN  11/23/23 1929

## 2023-11-27 NOTE — UTILIZATION REVIEW
NOTIFICATION OF ADMISSION DISCHARGE   This is a Notification of Discharge from [facility] Godd[facility]. Please be advised that this patient has been discharge from our facility. Below you will find the admission and discharge date and time including the patient’s disposition. UTILIZATION REVIEW CONTACT:  Ramila Ge  Utilization   Network Utilization Review Department  Phone: [phone] x carefully listen to the prompts. All voicemails are confidential.  Email: [email or web address]     ADMISSION INFORMATION  PRESENTATION DATE: 11/23/2023  3:02 PM    INPATIENT ADMISSION DATE: 11/23/23  7:50 PM   DISCHARGE DATE: 11/24/2023  2:11 PM   DISPOSITION:Home/Self Care    Network Utilization Review Department  ATTENTION: Please call with any questions or concerns to [phone] and carefully listen to the prompts so that you are directed to the right person. All voicemails are confidential.   For Discharge needs, contact Care Management DC Support Team at [phone] opt. 2  Send all requests for admission clinical reviews, approved or denied determinations and any other requests to dedicated fax number below belonging to the campus where the patient is receiving treatment.  List of dedicated fax numbers for the Facilities:  [place] DENIALS (Administrative/Medical Necessity) [phone]   DISCHARGE SUPPORT TEAM (Network) [phone] [address] [facility] (Maternity/NICU/Pediatrics) [phone]   [address] [place] [address] Drive [phone]   [facility] [address] [facility] [phone] [address] [place] [address] Street [address] [address] [facility] [phone]   [address] [place] [address]        Deysi Alaniz MD  Physician  Hospitalist     Discharge Summary     Addendum     Date of Service: 11/24/2023  2:11 PM       [address]  Discharge- Genette Lombard 1940, 82 y.o. female MRN: [id number]  Unit/Bed#: -01 Encounter: [id number]  Primary Care Provider: No primary care provider on file.    Date and time admitted to hospital: 11/23/2023  3:02 PM     * Acute metabolic encephalopathy-resolved as of 11/24/2023  Assessment & Plan  Most likely in the setting of dehydration/gastritis and dementia  CT head negative, UA negative, chest x-ray without opacities/pneumonia noted  See plan under gastritis and VERONICA     Gastritis  Assessment & Plan  CT abdomen pelvis revealing mild thickening of esophagus and stomach related to esophagitis and gastritis, no obstruction seen  Continue PPI for 2 weeks as an outpatient      Dementia without behavioral disturbance (HCC)  Assessment & Plan  Mostly nonverbal at baseline per patient's son  Fall precautions, delirium precautions with frequent verbal redirection encouraged     Hypertension  Assessment & Plan  Per patient's son her PCP discontinued her home lisinopril and amlodipine as her blood pressures have been very well-controlled at home  However, presenting with hypertension to 204/116 on arrival, possibly secondary to pain, dehydration  Given a dose of IV labetalol in the ED, will monitor and continue as needed IV labetalol with parameters  For now we will restart home p.o. amlodipine 5 mg daily as well, will need to follow-up with PCP     Constipation  Assessment & Plan  CT abdomen pelvis revealing redemonstrative gaseous distention and stool in colon similar to previous scan with also large amount of stool in rectum with possible impaction  Has been moving bowels at home per patient's son, but due to extensive fecal matter will initiate on senna nightly      VERONICA (acute kidney injury) (HCC)-resolved as of 11/24/2023  Assessment & Plan  Creatinine currently 1.32, baseline around 0.8-0.9 most likely setting of dehydration/dementia  IV fluid hydration  Avoid nephrotoxic agents, monitor BMP        Medical Problems         Resolved Problems  Date Reviewed: 11/23/2023              Resolved     VERONICA (acute kidney injury) ([address]) 11/24/2023       Resolved by  Jabier Amador MD     * (Principal) Acute metabolic encephalopathy 05/86/7863       Resolved by  Jabier Amador MD     Hypernatremia 11/24/2023       Resolved by  Jabier Amador MD         Discharging Physician / Practitioner: Jabier Amador MD  PCP: No primary care provider on file. Admission Date:   Admission Orders (From admission, onward)          Ordered         11/23/23 1950   INPATIENT ADMISSION  Once                               Discharge Date: 11/24/23     Consultations During Hospital Stay:  None     Procedures Performed:   None     Significant Findings / Test Results:   CT head: No acute intracranial hemorrhage, midline shift, or mass effect. Chronic small vessel ischemic changes. CT Abdomen: 1. Mild thickening of the esophagus and stomach may be due to esophagitis/gastritis. 2.  Redemonstrated extensive gaseous and stool distention of the colon similar to prior examination as described above. Stool-filled rectum, correlate for fecal impaction. 3.  Stable 2.2 cm indeterminate right adrenal nodule. Incidental Findings:   No new findings      Test Results Pending at Discharge (will require follow up): None     Outpatient Tests Requested:  Repeat BMP in 1 week      Complications:  None     Reason for Admission: Altered mentation     Hospital Course:   Fallon Arango is a 82 y.o. female patient who originally presented to the hospital on 11/23/2023 due to altered mentation, nausea.  CT abdomen showed esophagitis/gastritis. CT head was negative. Labs were significant for hypernatremia and VERONICA. She was given IV hydration overnight with resolution of both lab issues. Her mental status returned to baseline by the morning per her son. She was discharged home on omeprazole 40mg BID for 2 weeks and will need to follow up with her PCP to ensure resolution of her symptoms. The patient, initially admitted to the hospital as inpatient, was discharged earlier than expected given the following: rapid improvement in creatinine and mental status. Please see above list of diagnoses and related plan for additional information. Condition at Discharge: good     Discharge Day Visit / Exam:   Subjective:  Patient is minimally verbal at baseline. She nodded "no" when asked if she was in pain or nauseous. Per son, this is her baseline. Vitals: Blood Pressure: 164/99 (11/24/23 0808)  Pulse: 76 (11/24/23 0807)  Temperature: 97.6 °F (36.4 °C) (11/24/23 0808)  Temp Source: Oral (11/23/23 2031)  Respirations: 17 (11/23/23 2031)  Height: 5' 5" (165.1 cm) (11/23/23 2031)  Weight - Scale: 50.5 kg (111 lb 5.3 oz) (11/23/23 2031)  SpO2: 99 % (11/24/23 1100)  Exam:   Physical Exam  Vitals and nursing note reviewed. Constitutional:       Appearance: Normal appearance. HENT:      Head: Normocephalic and atraumatic. Mouth/Throat:      Mouth: Mucous membranes are moist.   Eyes:      Conjunctiva/sclera: Conjunctivae normal.      Pupils: Pupils are equal, round, and reactive to light. Cardiovascular:      Rate and Rhythm: Normal rate and regular rhythm. Pulses: Normal pulses. Heart sounds: Normal heart sounds. No murmur heard. No gallop. Pulmonary:      Effort: Pulmonary effort is normal. No respiratory distress. Breath sounds: Normal breath sounds. No wheezing or rales. Abdominal:      General: Abdomen is flat. Bowel sounds are normal. There is no distension. Palpations: Abdomen is soft. Tenderness: There is no abdominal tenderness. There is no guarding or rebound. Musculoskeletal:         General: No swelling. Normal range of motion. Skin:     General: Skin is warm and dry. Capillary Refill: Capillary refill takes less than 2 seconds. Neurological:      Mental Status: She is alert. Mental status is at baseline. Discussion with Family: Updated  (son and daughter) at bedside. Discharge instructions/Information to patient and family:   See after visit summary for information provided to patient and family. Provisions for Follow-Up Care:  See after visit summary for information related to follow-up care and any pertinent home health orders. Mobility at time of Discharge:   Basic Mobility Inpatient Raw Score: 22  JH-HLM Goal: 7: Walk 25 feet or more  JH-HLM Achieved: 1: Laying in bed  [facility] Goal NOT achieved. Continue to encourage mobility in post discharge setting. Disposition:   Home     Planned Readmission: None     Discharge Statement:  I spent 60 minutes discharging the patient. This time was spent on the day of discharge. I had direct contact with the patient on the day of discharge. Greater than 50% of the total time was spent examining patient, answering all patient questions, arranging and discussing plan of care with patient as well as directly providing post-discharge instructions. Additional time then spent on discharge activities. Discharge Medications:  See after visit summary for reconciled discharge medications provided to patient and/or family.        **Please Note: This note may have been constructed using a voice recognition system**           Revision History

## 2025-03-31 ENCOUNTER — APPOINTMENT (EMERGENCY)
Dept: CT IMAGING | Facility: HOSPITAL | Age: 85
DRG: 391 | End: 2025-03-31
Payer: COMMERCIAL

## 2025-03-31 ENCOUNTER — HOSPITAL ENCOUNTER (INPATIENT)
Facility: HOSPITAL | Age: 85
LOS: 1 days | Discharge: HOME | DRG: 391 | End: 2025-04-02
Attending: EMERGENCY MEDICINE | Admitting: HOSPITALIST
Payer: COMMERCIAL

## 2025-03-31 ENCOUNTER — APPOINTMENT (EMERGENCY)
Dept: RADIOLOGY | Facility: HOSPITAL | Age: 85
DRG: 391 | End: 2025-03-31
Payer: COMMERCIAL

## 2025-03-31 DIAGNOSIS — R40.4 EPISODE OF UNRESPONSIVENESS: Primary | ICD-10-CM

## 2025-03-31 DIAGNOSIS — I10 PRIMARY HYPERTENSION: ICD-10-CM

## 2025-03-31 DIAGNOSIS — K29.80 DUODENITIS: ICD-10-CM

## 2025-03-31 DIAGNOSIS — R79.89 ELEVATED TROPONIN: ICD-10-CM

## 2025-03-31 DIAGNOSIS — R79.89 ELEVATED TROPONIN LEVEL: ICD-10-CM

## 2025-03-31 DIAGNOSIS — R11.10 VOMITING: ICD-10-CM

## 2025-03-31 PROBLEM — K56.2 SIGMOID VOLVULUS (HCC): Status: ACTIVE | Noted: 2022-03-25

## 2025-03-31 LAB
2HR DELTA HS TROPONIN: 13 NG/L
ALBUMIN SERPL BCG-MCNC: 4.1 G/DL (ref 3.5–5)
ALP SERPL-CCNC: 41 U/L (ref 34–104)
ALT SERPL W P-5'-P-CCNC: 12 U/L (ref 7–52)
ANION GAP SERPL CALCULATED.3IONS-SCNC: 6 MMOL/L (ref 4–13)
AST SERPL W P-5'-P-CCNC: 24 U/L (ref 13–39)
ATRIAL RATE: 92 BPM
ATRIAL RATE: 94 BPM
BASOPHILS # BLD AUTO: 0.04 THOUSANDS/ÂΜL (ref 0–0.1)
BASOPHILS NFR BLD AUTO: 1 % (ref 0–1)
BILIRUB DIRECT SERPL-MCNC: 0.05 MG/DL (ref 0–0.2)
BILIRUB SERPL-MCNC: 0.83 MG/DL (ref 0.2–1)
BUN SERPL-MCNC: 19 MG/DL (ref 5–25)
CALCIUM SERPL-MCNC: 9.8 MG/DL (ref 8.4–10.2)
CARDIAC TROPONIN I PNL SERPL HS: 51 NG/L (ref ?–50)
CARDIAC TROPONIN I PNL SERPL HS: 64 NG/L (ref ?–50)
CHLORIDE SERPL-SCNC: 109 MMOL/L (ref 96–108)
CO2 SERPL-SCNC: 27 MMOL/L (ref 21–32)
CREAT SERPL-MCNC: 1.19 MG/DL (ref 0.6–1.3)
EOSINOPHIL # BLD AUTO: 0.18 THOUSAND/ÂΜL (ref 0–0.61)
EOSINOPHIL NFR BLD AUTO: 4 % (ref 0–6)
ERYTHROCYTE [DISTWIDTH] IN BLOOD BY AUTOMATED COUNT: 13.2 % (ref 11.6–15.1)
FLUAV AG UPPER RESP QL IA.RAPID: NEGATIVE
FLUBV AG UPPER RESP QL IA.RAPID: NEGATIVE
GFR SERPL CREATININE-BSD FRML MDRD: 42 ML/MIN/1.73SQ M
GLUCOSE SERPL-MCNC: 75 MG/DL (ref 65–140)
HCT VFR BLD AUTO: 39.6 % (ref 34.8–46.1)
HGB BLD-MCNC: 12.3 G/DL (ref 11.5–15.4)
IMM GRANULOCYTES # BLD AUTO: 0.01 THOUSAND/UL (ref 0–0.2)
IMM GRANULOCYTES NFR BLD AUTO: 0 % (ref 0–2)
LIPASE SERPL-CCNC: 39 U/L (ref 11–82)
LYMPHOCYTES # BLD AUTO: 1.55 THOUSANDS/ÂΜL (ref 0.6–4.47)
LYMPHOCYTES NFR BLD AUTO: 37 % (ref 14–44)
MAGNESIUM SERPL-MCNC: 1.9 MG/DL (ref 1.9–2.7)
MCH RBC QN AUTO: 27.2 PG (ref 26.8–34.3)
MCHC RBC AUTO-ENTMCNC: 31.1 G/DL (ref 31.4–37.4)
MCV RBC AUTO: 87 FL (ref 82–98)
MONOCYTES # BLD AUTO: 0.44 THOUSAND/ÂΜL (ref 0.17–1.22)
MONOCYTES NFR BLD AUTO: 11 % (ref 4–12)
NEUTROPHILS # BLD AUTO: 1.96 THOUSANDS/ÂΜL (ref 1.85–7.62)
NEUTS SEG NFR BLD AUTO: 47 % (ref 43–75)
NRBC BLD AUTO-RTO: 0 /100 WBCS
P AXIS: 64 DEGREES
P AXIS: 76 DEGREES
PLATELET # BLD AUTO: 200 THOUSANDS/UL (ref 149–390)
PMV BLD AUTO: 10.6 FL (ref 8.9–12.7)
POTASSIUM SERPL-SCNC: 4.2 MMOL/L (ref 3.5–5.3)
PR INTERVAL: 150 MS
PR INTERVAL: 152 MS
PROT SERPL-MCNC: 6.6 G/DL (ref 6.4–8.4)
QRS AXIS: -42 DEGREES
QRS AXIS: -56 DEGREES
QRSD INTERVAL: 114 MS
QRSD INTERVAL: 132 MS
QT INTERVAL: 334 MS
QT INTERVAL: 378 MS
QTC INTERVAL: 413 MS
QTC INTERVAL: 472 MS
RBC # BLD AUTO: 4.53 MILLION/UL (ref 3.81–5.12)
SARS-COV+SARS-COV-2 AG RESP QL IA.RAPID: NEGATIVE
SODIUM SERPL-SCNC: 142 MMOL/L (ref 135–147)
T WAVE AXIS: 80 DEGREES
T WAVE AXIS: 87 DEGREES
VENTRICULAR RATE: 92 BPM
VENTRICULAR RATE: 94 BPM
WBC # BLD AUTO: 4.18 THOUSAND/UL (ref 4.31–10.16)

## 2025-03-31 PROCEDURE — 99285 EMERGENCY DEPT VISIT HI MDM: CPT

## 2025-03-31 PROCEDURE — 83690 ASSAY OF LIPASE: CPT | Performed by: EMERGENCY MEDICINE

## 2025-03-31 PROCEDURE — 80076 HEPATIC FUNCTION PANEL: CPT | Performed by: EMERGENCY MEDICINE

## 2025-03-31 PROCEDURE — 70450 CT HEAD/BRAIN W/O DYE: CPT

## 2025-03-31 PROCEDURE — 80048 BASIC METABOLIC PNL TOTAL CA: CPT | Performed by: EMERGENCY MEDICINE

## 2025-03-31 PROCEDURE — 99285 EMERGENCY DEPT VISIT HI MDM: CPT | Performed by: EMERGENCY MEDICINE

## 2025-03-31 PROCEDURE — 93005 ELECTROCARDIOGRAM TRACING: CPT

## 2025-03-31 PROCEDURE — 99223 1ST HOSP IP/OBS HIGH 75: CPT | Performed by: INTERNAL MEDICINE

## 2025-03-31 PROCEDURE — 71260 CT THORAX DX C+: CPT

## 2025-03-31 PROCEDURE — 85025 COMPLETE CBC W/AUTO DIFF WBC: CPT | Performed by: EMERGENCY MEDICINE

## 2025-03-31 PROCEDURE — 87804 INFLUENZA ASSAY W/OPTIC: CPT | Performed by: EMERGENCY MEDICINE

## 2025-03-31 PROCEDURE — 84484 ASSAY OF TROPONIN QUANT: CPT | Performed by: EMERGENCY MEDICINE

## 2025-03-31 PROCEDURE — 71045 X-RAY EXAM CHEST 1 VIEW: CPT

## 2025-03-31 PROCEDURE — 96374 THER/PROPH/DIAG INJ IV PUSH: CPT

## 2025-03-31 PROCEDURE — 87811 SARS-COV-2 COVID19 W/OPTIC: CPT | Performed by: EMERGENCY MEDICINE

## 2025-03-31 PROCEDURE — 36415 COLL VENOUS BLD VENIPUNCTURE: CPT | Performed by: EMERGENCY MEDICINE

## 2025-03-31 PROCEDURE — 96361 HYDRATE IV INFUSION ADD-ON: CPT

## 2025-03-31 PROCEDURE — 74177 CT ABD & PELVIS W/CONTRAST: CPT

## 2025-03-31 PROCEDURE — 83735 ASSAY OF MAGNESIUM: CPT | Performed by: EMERGENCY MEDICINE

## 2025-03-31 PROCEDURE — 93010 ELECTROCARDIOGRAM REPORT: CPT | Performed by: INTERNAL MEDICINE

## 2025-03-31 RX ORDER — ONDANSETRON 2 MG/ML
4 INJECTION INTRAMUSCULAR; INTRAVENOUS EVERY 6 HOURS PRN
Status: DISCONTINUED | OUTPATIENT
Start: 2025-03-31 | End: 2025-04-02 | Stop reason: HOSPADM

## 2025-03-31 RX ORDER — HEPARIN SODIUM 5000 [USP'U]/ML
5000 INJECTION, SOLUTION INTRAVENOUS; SUBCUTANEOUS EVERY 12 HOURS
Status: DISCONTINUED | OUTPATIENT
Start: 2025-04-01 | End: 2025-04-02 | Stop reason: HOSPADM

## 2025-03-31 RX ORDER — ENOXAPARIN SODIUM 100 MG/ML
30 INJECTION SUBCUTANEOUS DAILY
Status: DISCONTINUED | OUTPATIENT
Start: 2025-04-01 | End: 2025-03-31

## 2025-03-31 RX ORDER — METOPROLOL TARTRATE 1 MG/ML
5 INJECTION, SOLUTION INTRAVENOUS EVERY 6 HOURS PRN
Status: DISCONTINUED | OUTPATIENT
Start: 2025-03-31 | End: 2025-04-01

## 2025-03-31 RX ORDER — PANTOPRAZOLE SODIUM 40 MG/10ML
40 INJECTION, POWDER, LYOPHILIZED, FOR SOLUTION INTRAVENOUS
Status: DISCONTINUED | OUTPATIENT
Start: 2025-03-31 | End: 2025-04-02 | Stop reason: HOSPADM

## 2025-03-31 RX ORDER — DOCUSATE SODIUM 100 MG/1
100 CAPSULE, LIQUID FILLED ORAL 2 TIMES DAILY
Status: DISCONTINUED | OUTPATIENT
Start: 2025-04-01 | End: 2025-04-02 | Stop reason: HOSPADM

## 2025-03-31 RX ORDER — ASPIRIN 81 MG/1
81 TABLET, CHEWABLE ORAL DAILY
Status: DISCONTINUED | OUTPATIENT
Start: 2025-04-01 | End: 2025-04-02 | Stop reason: HOSPADM

## 2025-03-31 RX ORDER — ASPIRIN 81 MG/1
324 TABLET, CHEWABLE ORAL ONCE
Status: COMPLETED | OUTPATIENT
Start: 2025-03-31 | End: 2025-03-31

## 2025-03-31 RX ORDER — ONDANSETRON 2 MG/ML
4 INJECTION INTRAMUSCULAR; INTRAVENOUS ONCE
Status: COMPLETED | OUTPATIENT
Start: 2025-03-31 | End: 2025-03-31

## 2025-03-31 RX ORDER — ACETAMINOPHEN 325 MG/1
650 TABLET ORAL EVERY 4 HOURS PRN
Status: DISCONTINUED | OUTPATIENT
Start: 2025-03-31 | End: 2025-04-02 | Stop reason: HOSPADM

## 2025-03-31 RX ADMIN — ASPIRIN 324 MG: 81 TABLET, CHEWABLE ORAL at 21:54

## 2025-03-31 RX ADMIN — ONDANSETRON 4 MG: 2 INJECTION INTRAMUSCULAR; INTRAVENOUS at 19:02

## 2025-03-31 RX ADMIN — SODIUM CHLORIDE 1000 ML: 0.9 INJECTION, SOLUTION INTRAVENOUS at 19:02

## 2025-03-31 RX ADMIN — IOHEXOL 100 ML: 350 INJECTION, SOLUTION INTRAVENOUS at 19:53

## 2025-03-31 NOTE — ED PROVIDER NOTES
Time reflects when diagnosis was documented in both MDM as applicable and the Disposition within this note       Time User Action Codes Description Comment    3/31/2025  9:43 PM Missy Guerrier Add [R40.4] Episode of unresponsiveness     3/31/2025  9:43 PM Missy Guerrier Add [R11.10] Vomiting     3/31/2025  9:44 PM Missy Guerrier Add [R79.89] Elevated troponin           ED Disposition       ED Disposition   Admit    Condition   Stable    Date/Time   Mon Mar 31, 2025  9:44 PM    Comment   Case was discussed with QUAN and the patient's admission status was agreed to be Admission Status: observation status to the service of Dr. Ferguson.               Assessment & Plan       Medical Decision Making  84-year-old female presents to the ED for acute episode of vomiting.  According to the son, she has baseline dementia and is nonverbal and slumped over onto her side onto the couch tonight which was followed by a period of being dazed and then followed by acute vomiting.  Unable to obtain any other history.  Will workup broadly with cardiac and abdominal labs, noncontrast head CT to rule out acute ICH, chest x-ray and CT scan of the chest, abdomen and pelvis.  Will provide Zofran and IV fluids.    Amount and/or Complexity of Data Reviewed  Labs: ordered. Decision-making details documented in ED Course.  Radiology: ordered and independent interpretation performed. Decision-making details documented in ED Course.  ECG/medicine tests: ordered and independent interpretation performed. Decision-making details documented in ED Course.    Risk  OTC drugs.  Prescription drug management.  Decision regarding hospitalization.        ED Course as of 03/31/25 2247   Mon Mar 31, 2025   1947 hs TnI 0hr(!): 51   2134 Updated patient's son about workup thus far including the elevated troponin of unclear etiology and my recommendation for observation admission.       Medications   sodium chloride 0.9 % bolus 1,000 mL (0 mL Intravenous  Stopped 3/31/25 2104)   ondansetron (ZOFRAN) injection 4 mg (4 mg Intravenous Given 3/31/25 1902)   iohexol (OMNIPAQUE) 350 MG/ML injection (MULTI-DOSE) 100 mL (100 mL Intravenous Given 3/31/25 1953)   aspirin chewable tablet 324 mg (324 mg Oral Given 3/31/25 2154)       ED Risk Strat Scores   HEART Risk Score      Flowsheet Row Most Recent Value   Heart Score Risk Calculator    History 0 Filed at: 03/31/2025 2247   ECG 1 Filed at: 03/31/2025 2247   Age 2 Filed at: 03/31/2025 2247   Risk Factors 1 Filed at: 03/31/2025 2247   Troponin 2 Filed at: 03/31/2025 2247   HEART Score 6 Filed at: 03/31/2025 2247          HEART Risk Score      Flowsheet Row Most Recent Value   Heart Score Risk Calculator    History 0 Filed at: 03/31/2025 2247   ECG 1 Filed at: 03/31/2025 2247   Age 2 Filed at: 03/31/2025 2247   Risk Factors 1 Filed at: 03/31/2025 2247   Troponin 2 Filed at: 03/31/2025 2247   HEART Score 6 Filed at: 03/31/2025 2247                                                  History of Present Illness       Chief Complaint   Patient presents with    Vomiting     Pt arrives ems from home, son state n/v/d for about an hour. Hx of dementia        Past Medical History:   Diagnosis Date    Acute kidney injury (HCC) 1/3/2022    Dementia (HCC)     Hypertension       History reviewed. No pertinent surgical history.   Family History   Problem Relation Age of Onset    No Known Problems Mother     No Known Problems Father       Social History     Tobacco Use    Smoking status: Never    Smokeless tobacco: Never   Vaping Use    Vaping status: Never Used   Substance Use Topics    Alcohol use: Never    Drug use: Never      E-Cigarette/Vaping    E-Cigarette Use Never User       E-Cigarette/Vaping Substances    Nicotine No     THC No     CBD No     Flavoring No     Other No     Unknown No       I have reviewed and agree with the history as documented.     Patient is an 84-year-old female with past medical history of dementia, hypertension,  history of sigmoid volvulus status post sigmoidectomy, presents to the emergency department with her son for acute vomiting.  Son provided majority of the history and reports that patient has history of dementia and is essentially nonverbal at baseline.  She also only speaks [place] Creole.  According to son, she was in her usual state of health yesterday and all day today and this evening, all of a sudden she slumped over onto her side.  She did not hit her head and landed on the couch and never lost consciousness per the son.  He stated that she was very dazed and seemed out of it and was not responding to him for a few moments and then she vomited.  She vomited a total of 2 times, nonbilious and nonbloody.  This is when he called EMS.  Son reports that she is currently at her baseline.  He is unaware of any diarrhea or if she is having any pain.  Patient not answering any questions even with translation.  Review of systems unobtainable.      History provided by:  Relative (Patient's son, with whom she lives)  History limited by:  Dementia and patient nonverbal   used: Son translated English and [place] Creole however patient would not verbalize anything to his son and he stated this is her baseline as she is essentially nonverbal majority of the time..    Vomiting      Review of Systems   Unable to perform ROS: Patient nonverbal (Patient has dementia and is non-verbal at baseline per son.)   Gastrointestinal:  Positive for vomiting.           Objective       ED Triage Vitals   Temperature Pulse Blood Pressure Respirations SpO2 Patient Position - Orthostatic VS   03/31/25 1900 03/31/25 1834 03/31/25 1834 03/31/25 1834 03/31/25 1834 03/31/25 1834   98.1 °F (36.7 °C) 97 130/84 16 98 % Lying      Temp Source Heart Rate Source BP Location FiO2 (%) Pain Score    03/31/25 1900 03/31/25 1834 03/31/25 1834 -- --    Oral Monitor Right arm        Vitals      Date and Time Temp Pulse SpO2 Resp BP Pain  Score FACES Pain Rating User   03/31/25 2100 -- 96 97 % 20 173/102 -- -- KG   03/31/25 2034 -- 97 91 % 20 -- -- -- KG   03/31/25 1930 -- 85 98 % 22 149/86 -- -- KG   03/31/25 1900 98.1 °F (36.7 °C) 91 98 % 15 135/93 -- -- KG   03/31/25 1834 -- 97 98 % 16 130/84 -- -- TE          Vitals:    03/31/25 1900 03/31/25 1930 03/31/25 2034 03/31/25 2100   BP: 135/93 149/86  (!) 173/102   BP Location:       Pulse: 91 85 97 96   Resp: 15 22 20 20   Temp: 98.1 °F (36.7 °C)      TempSrc: Oral      SpO2: 98% 98% 91% 97%        Physical Exam  Vitals and nursing note reviewed.   Constitutional:       General: She is not in acute distress.     Appearance: Normal appearance. She is well-developed. She is not ill-appearing, toxic-appearing or diaphoretic.   HENT:      Head: Normocephalic and atraumatic.      Right Ear: External ear normal.      Left Ear: External ear normal.      Nose: Nose normal.      Mouth/Throat:      Mouth: Mucous membranes are moist.      Pharynx: Oropharynx is clear.   Eyes:      Extraocular Movements: Extraocular movements intact.      Conjunctiva/sclera: Conjunctivae normal.      Pupils: Pupils are equal, round, and reactive to light.   Neck:      Vascular: No JVD.   Cardiovascular:      Rate and Rhythm: Normal rate and regular rhythm.      Pulses: Normal pulses.      Heart sounds: Normal heart sounds. No murmur heard.     No friction rub. No gallop.   Pulmonary:      Effort: Pulmonary effort is normal. No respiratory distress.      Breath sounds: Normal breath sounds. No wheezing, rhonchi or rales.   Abdominal:      General: There is no distension.      Palpations: Abdomen is soft.      Tenderness: There is no abdominal tenderness. There is no guarding or rebound.   Musculoskeletal:         General: No swelling or tenderness. Normal range of motion.      Cervical back: Normal range of motion and neck supple. No rigidity.      Right lower leg: No edema.      Left lower leg: No edema.   Skin:     General: Skin  is warm and dry.      Capillary Refill: Capillary refill takes less than 2 seconds.      Coloration: Skin is not pale.      Findings: No erythema or rash.   Neurological:      Mental Status: She is alert.      Comments: Patient nonverbal at baseline.  She is moving all extremities equally and spontaneously and has no obvious focal motor or sensory deficits however is not following commands.   Psychiatric:         Mood and Affect: Mood normal.         Behavior: Behavior normal.         Results Reviewed       Procedure Component Value Units Date/Time    HS Troponin I 2hr [[id number]]  (Abnormal) Collected: 03/31/25 2104    Lab Status: Final result Specimen: Blood from Arm, Left Updated: 03/31/25 2143     hs TnI 2hr 64 ng/L      Delta 2hr hsTnI 13 ng/L     HS Troponin I 4hr [[id number]]     Lab Status: No result Specimen: Blood     FLU/COVID Rapid Antigen (30 min. TAT) - Preferred screening test in ED [[id number]]  (Normal) Collected: 03/31/25 1856    Lab Status: Final result Specimen: Nares from Nose Updated: 03/1942     SARS COV Rapid Antigen Negative     Influenza A Rapid Antigen Negative     Influenza B Rapid Antigen Negative    Narrative:      This test has been performed using the Quidel Ester 2 FLU+SARS Antigen test under the Emergency Use Authorization (EUA). This test has been validated by the  and verified by the performing laboratory. The Ester uses lateral flow immunofluorescent sandwich assay to detect SARS-COV, Influenza A and Influenza B Antigen.     The Quidel Ester 2 SARS Antigen test does not differentiate between SARS-CoV and SARS-CoV-2.     Negative results are presumptive and may be confirmed with a molecular assay, if necessary, for patient management. Negative results do not rule out SARS-CoV-2 or influenza infection and should not be used as the sole basis for treatment or patient management decisions. A negative test result may occur if the level of antigen in a sample is below  the limit of detection of this test.     Positive results are indicative of the presence of viral antigens, but do not rule out bacterial infection or co-infection with other viruses.     All test results should be used as an adjunct to clinical observations and other information available to the provider.    FOR PEDIATRIC PATIENTS - copy/paste COVID Guidelines URL to browser: [email or web address]    HS Troponin 0hr (reflex protocol) [[id number]]  (Abnormal) Collected: 03/31/25 1855    Lab Status: Final result Specimen: Blood from Arm, Left Updated: 03/31/25 1938     hs TnI 0hr 51 ng/L     Basic metabolic panel [[id number]]  (Abnormal) Collected: 03/31/25 1855    Lab Status: Final result Specimen: Blood from Arm, Left Updated: 03/31/25 1932     Sodium 142 mmol/L      Potassium 4.2 mmol/L      Chloride 109 mmol/L      CO2 27 mmol/L      ANION GAP 6 mmol/L      BUN 19 mg/dL      Creatinine 1.19 mg/dL      Glucose 75 mg/dL      Calcium 9.8 mg/dL      eGFR 42 ml/min/1.73sq m     Narrative:      National Kidney Disease Foundation guidelines for Chronic Kidney Disease (CKD):     Stage 1 with normal or high GFR (GFR > 90 mL/min/1.73 square meters)    Stage 2 Mild CKD (GFR = 60-89 mL/min/1.73 square meters)    Stage 3A Moderate CKD (GFR = 45-59 mL/min/1.73 square meters)    Stage 3B Moderate CKD (GFR = 30-44 mL/min/1.73 square meters)    Stage 4 Severe CKD (GFR = 15-29 mL/min/1.73 square meters)    Stage 5 End Stage CKD (GFR <15 mL/min/1.73 square meters)  Note: GFR calculation is accurate only with a steady state creatinine    Hepatic function panel [[id number]]  (Normal) Collected: 03/31/25 1855    Lab Status: Final result Specimen: Blood from Arm, Left Updated: 03/31/25 1932     Total Bilirubin 0.83 mg/dL      Bilirubin, Direct 0.05 mg/dL      Alkaline Phosphatase 41 U/L      AST 24 U/L      ALT 12 U/L      Total Protein 6.6 g/dL      Albumin 4.1 g/dL     Magnesium  [[id number]]  (Normal) Collected: 03/31/25 1855    Lab Status: Final result Specimen: Blood from Arm, Left Updated: 03/31/25 1932     Magnesium 1.9 mg/dL     Lipase [[id number]]  (Normal) Collected: 03/31/25 1855    Lab Status: Final result Specimen: Blood from Arm, Left Updated: 03/31/25 1932     Lipase 39 u/L     CBC and differential [[id number]]  (Abnormal) Collected: 03/31/25 1855    Lab Status: Final result Specimen: Blood from Arm, Left Updated: 03/31/25 1906     WBC 4.18 Thousand/uL      RBC 4.53 Million/uL      Hemoglobin 12.3 g/dL      Hematocrit 39.6 %      MCV 87 fL      MCH 27.2 pg      MCHC 31.1 g/dL      RDW 13.2 %      MPV 10.6 fL      Platelets 200 Thousands/uL      nRBC 0 /100 WBCs      Segmented % 47 %      Immature Grans % 0 %      Lymphocytes % 37 %      Monocytes % 11 %      Eosinophils Relative 4 %      Basophils Relative 1 %      Absolute Neutrophils 1.96 Thousands/µL      Absolute Immature Grans 0.01 Thousand/uL      Absolute Lymphocytes 1.55 Thousands/µL      Absolute Monocytes 0.44 Thousand/µL      Eosinophils Absolute 0.18 Thousand/µL      Basophils Absolute 0.04 Thousands/µL     UA (URINE) with reflex to Scope [[id number]]     Lab Status: No result Specimen: Urine             CT head without contrast   Final Interpretation by Aubrey Bullock MD (03/31 2125)      No acute intracranial abnormality.      Stable microangiopathic changes.                  Workstation performed: [id number]         CT chest abdomen pelvis w contrast   Final Interpretation by Christopher Amaya MD (03/31 2110)         1. No evidence of acute cardiopulmonary process.   2. Mild wall thickening in the second portion of the duodenum could represent duodenitis. No findings to indicate gastritis or enteritis. Significant gaseous distention of the colon without evidence of bowel obstruction or colitis.   3. No findings to indicate acute trauma in the chest, abdomen or pelvis.               Workstation performed:  [id number]         XR chest 1 view portable   ED Interpretation by Missy Guerrier DO (03/31 1958)   Stable significant left elevated hemidiaphragm with colonic distention.  Heart and mediastinum shifted rightward.  No significant change from prior chest x-ray.          ECG 12 Lead Documentation Only    Date/Time: 3/31/2025 6:57 PM    Performed by: Missy Guerrier DO  Authorized by: Missy Guerrier DO    ECG reviewed by me, the ED Provider: yes    Patient location:  ED  Previous ECG:     Previous ECG:  Compared to current    Comparison ECG info:  11-    Similarity:  Changes noted (Inverted T waves has replaced nonspecific T wave abnormality in the anterior leads)  Rate:     ECG rate:  92    ECG rate assessment: normal    Rhythm:     Rhythm: sinus rhythm    Ectopy:     Ectopy: none    QRS:     QRS axis:  Left    QRS intervals:  Normal  Conduction:     Conduction: abnormal      Abnormal conduction: LAFB    ST segments:     ST segments:  Normal  T waves:     T waves: inverted      Inverted:  V2, V3, V4 and aVL  Other findings:     Other findings: FABIAN and LVH    ECG 12 Lead Documentation Only    Date/Time: 3/31/2025 9:42 PM    Performed by: Missy Guerrier DO  Authorized by: iMssy Guerrier DO    ECG reviewed by me, the ED Provider: yes    Patient location:  ED  Rate:     ECG rate:  94    ECG rate assessment: normal    Rhythm:     Rhythm: sinus rhythm    Ectopy:     Ectopy: none    QRS:     QRS axis:  Left    QRS intervals:  Wide  Conduction:     Conduction: abnormal      Abnormal conduction: non-specific intraventricular conduction delay    ST segments:     ST segments:  Normal  T waves:     T waves: inverted      Inverted:  V3, V2, V4 and aVL  Other findings:     Other findings: DAVID        ED Medication and Procedure Management   Prior to Admission Medications   Prescriptions Last Dose Informant Patient Reported? Taking?   amLODIPine (NORVASC) 5 mg tablet   No No    Sig: Take 1 tablet (5 mg total) by mouth daily   Patient not taking: Reported on 11/23/2023   omeprazole (PriLOSEC) 40 MG capsule   No No   Sig: Take 1 capsule (40 mg total) by mouth 2 (two) times a day   senna (SENOKOT) 8.6 mg   No No   Sig: Take 1 tablet (8.6 mg total) by mouth daily   Patient not taking: Reported on 11/23/2023      Facility-Administered Medications: None     Patient's Medications   Discharge Prescriptions    No medications on file     No discharge procedures on file.  ED SEPSIS DOCUMENTATION   Time reflects when diagnosis was documented in both MDM as applicable and the Disposition within this note       Time User Action Codes Description Comment    3/31/2025  9:43 PM Missy Guerrier [R40.4] Episode of unresponsiveness     3/31/2025  9:43 PM Missy Guerrier [R11.10] Vomiting     3/31/2025  9:44 PM Missy Guerrier [R79.89] Elevated troponin                  Missy Guerrier DO  03/31/25 6941

## 2025-04-01 PROBLEM — E43 SEVERE PROTEIN-CALORIE MALNUTRITION (HCC): Status: ACTIVE | Noted: 2025-04-01

## 2025-04-01 LAB
4HR DELTA HS TROPONIN: 34 NG/L
ANION GAP SERPL CALCULATED.3IONS-SCNC: 6 MMOL/L (ref 4–13)
ATRIAL RATE: 79 BPM
ATRIAL RATE: 89 BPM
ATRIAL RATE: 89 BPM
BASOPHILS # BLD AUTO: 0.04 THOUSANDS/ÂΜL (ref 0–0.1)
BASOPHILS NFR BLD AUTO: 1 % (ref 0–1)
BILIRUB UR QL STRIP: NEGATIVE
BUN SERPL-MCNC: 16 MG/DL (ref 5–25)
CALCIUM SERPL-MCNC: 9 MG/DL (ref 8.4–10.2)
CARDIAC TROPONIN I PNL SERPL HS: 85 NG/L (ref ?–50)
CHLORIDE SERPL-SCNC: 108 MMOL/L (ref 96–108)
CHOLEST SERPL-MCNC: 215 MG/DL (ref ?–200)
CLARITY UR: CLEAR
CO2 SERPL-SCNC: 25 MMOL/L (ref 21–32)
COLOR UR: COLORLESS
CREAT SERPL-MCNC: 1.12 MG/DL (ref 0.6–1.3)
EOSINOPHIL # BLD AUTO: 0.09 THOUSAND/ÂΜL (ref 0–0.61)
EOSINOPHIL NFR BLD AUTO: 2 % (ref 0–6)
ERYTHROCYTE [DISTWIDTH] IN BLOOD BY AUTOMATED COUNT: 13.1 % (ref 11.6–15.1)
GFR SERPL CREATININE-BSD FRML MDRD: 45 ML/MIN/1.73SQ M
GLUCOSE P FAST SERPL-MCNC: 99 MG/DL (ref 65–99)
GLUCOSE SERPL-MCNC: 99 MG/DL (ref 65–140)
GLUCOSE UR STRIP-MCNC: NEGATIVE MG/DL
HCT VFR BLD AUTO: 41.4 % (ref 34.8–46.1)
HDLC SERPL-MCNC: 54 MG/DL
HGB BLD-MCNC: 13.1 G/DL (ref 11.5–15.4)
HGB UR QL STRIP.AUTO: NEGATIVE
IMM GRANULOCYTES # BLD AUTO: 0.01 THOUSAND/UL (ref 0–0.2)
IMM GRANULOCYTES NFR BLD AUTO: 0 % (ref 0–2)
KETONES UR STRIP-MCNC: NEGATIVE MG/DL
LDLC SERPL CALC-MCNC: 127 MG/DL (ref 0–100)
LEUKOCYTE ESTERASE UR QL STRIP: NEGATIVE
LYMPHOCYTES # BLD AUTO: 1.27 THOUSANDS/ÂΜL (ref 0.6–4.47)
LYMPHOCYTES NFR BLD AUTO: 28 % (ref 14–44)
MAGNESIUM SERPL-MCNC: 2 MG/DL (ref 1.9–2.7)
MCH RBC QN AUTO: 27.3 PG (ref 26.8–34.3)
MCHC RBC AUTO-ENTMCNC: 31.6 G/DL (ref 31.4–37.4)
MCV RBC AUTO: 86 FL (ref 82–98)
MONOCYTES # BLD AUTO: 0.51 THOUSAND/ÂΜL (ref 0.17–1.22)
MONOCYTES NFR BLD AUTO: 11 % (ref 4–12)
NEUTROPHILS # BLD AUTO: 2.58 THOUSANDS/ÂΜL (ref 1.85–7.62)
NEUTS SEG NFR BLD AUTO: 58 % (ref 43–75)
NITRITE UR QL STRIP: NEGATIVE
NRBC BLD AUTO-RTO: 0 /100 WBCS
P AXIS: 57 DEGREES
P AXIS: 57 DEGREES
P AXIS: 60 DEGREES
PH UR STRIP.AUTO: 7.5 [PH]
PLATELET # BLD AUTO: 208 THOUSANDS/UL (ref 149–390)
PMV BLD AUTO: 10.7 FL (ref 8.9–12.7)
POTASSIUM SERPL-SCNC: 4.3 MMOL/L (ref 3.5–5.3)
PR INTERVAL: 146 MS
PR INTERVAL: 150 MS
PR INTERVAL: 168 MS
PROT UR STRIP-MCNC: NEGATIVE MG/DL
QRS AXIS: -62 DEGREES
QRS AXIS: -64 DEGREES
QRS AXIS: -66 DEGREES
QRSD INTERVAL: 120 MS
QRSD INTERVAL: 122 MS
QRSD INTERVAL: 132 MS
QT INTERVAL: 382 MS
QT INTERVAL: 390 MS
QT INTERVAL: 416 MS
QTC INTERVAL: 464 MS
QTC INTERVAL: 474 MS
QTC INTERVAL: 477 MS
RBC # BLD AUTO: 4.8 MILLION/UL (ref 3.81–5.12)
SODIUM SERPL-SCNC: 139 MMOL/L (ref 135–147)
SP GR UR STRIP.AUTO: 1.02 (ref 1–1.03)
T WAVE AXIS: 58 DEGREES
T WAVE AXIS: 67 DEGREES
T WAVE AXIS: 75 DEGREES
TRIGL SERPL-MCNC: 169 MG/DL (ref ?–150)
UROBILINOGEN UR STRIP-ACNC: <2 MG/DL
VENTRICULAR RATE: 79 BPM
VENTRICULAR RATE: 89 BPM
VENTRICULAR RATE: 89 BPM
WBC # BLD AUTO: 4.5 THOUSAND/UL (ref 4.31–10.16)

## 2025-04-01 PROCEDURE — 85025 COMPLETE CBC W/AUTO DIFF WBC: CPT

## 2025-04-01 PROCEDURE — 83735 ASSAY OF MAGNESIUM: CPT

## 2025-04-01 PROCEDURE — 81003 URINALYSIS AUTO W/O SCOPE: CPT | Performed by: EMERGENCY MEDICINE

## 2025-04-01 PROCEDURE — 80048 BASIC METABOLIC PNL TOTAL CA: CPT

## 2025-04-01 PROCEDURE — 80061 LIPID PANEL: CPT

## 2025-04-01 PROCEDURE — 93010 ELECTROCARDIOGRAM REPORT: CPT | Performed by: INTERNAL MEDICINE

## 2025-04-01 PROCEDURE — 99232 SBSQ HOSP IP/OBS MODERATE 35: CPT

## 2025-04-01 PROCEDURE — 93005 ELECTROCARDIOGRAM TRACING: CPT

## 2025-04-01 PROCEDURE — 99222 1ST HOSP IP/OBS MODERATE 55: CPT | Performed by: INTERNAL MEDICINE

## 2025-04-01 RX ORDER — LABETALOL HYDROCHLORIDE 5 MG/ML
10 INJECTION, SOLUTION INTRAVENOUS EVERY 6 HOURS PRN
Status: DISCONTINUED | OUTPATIENT
Start: 2025-04-01 | End: 2025-04-02 | Stop reason: HOSPADM

## 2025-04-01 RX ORDER — AMLODIPINE BESYLATE 5 MG/1
5 TABLET ORAL DAILY
Status: DISCONTINUED | OUTPATIENT
Start: 2025-04-01 | End: 2025-04-02 | Stop reason: HOSPADM

## 2025-04-01 RX ADMIN — HEPARIN SODIUM 5000 UNITS: 5000 INJECTION INTRAVENOUS; SUBCUTANEOUS at 17:59

## 2025-04-01 RX ADMIN — PANTOPRAZOLE SODIUM 40 MG: 40 INJECTION, POWDER, FOR SOLUTION INTRAVENOUS at 08:46

## 2025-04-01 RX ADMIN — PANTOPRAZOLE SODIUM 40 MG: 40 INJECTION, POWDER, FOR SOLUTION INTRAVENOUS at 00:10

## 2025-04-01 RX ADMIN — METOROPROLOL TARTRATE 5 MG: 5 INJECTION, SOLUTION INTRAVENOUS at 00:11

## 2025-04-01 RX ADMIN — LABETALOL HYDROCHLORIDE 10 MG: 5 INJECTION, SOLUTION INTRAVENOUS at 20:05

## 2025-04-01 RX ADMIN — AMLODIPINE BESYLATE 5 MG: 5 TABLET ORAL at 10:46

## 2025-04-01 RX ADMIN — HEPARIN SODIUM 5000 UNITS: 5000 INJECTION INTRAVENOUS; SUBCUTANEOUS at 06:13

## 2025-04-01 RX ADMIN — ASPIRIN 81 MG: 81 TABLET, CHEWABLE ORAL at 10:13

## 2025-04-01 RX ADMIN — LABETALOL HYDROCHLORIDE 10 MG: 5 INJECTION, SOLUTION INTRAVENOUS at 08:46

## 2025-04-01 NOTE — ASSESSMENT & PLAN NOTE
Troponins: 51/64/85  Nonischemic myocardial injury secondary to hypertension, unresponsive episode  Echocardiogram pending

## 2025-04-01 NOTE — PROGRESS NOTES
Progress Note - Hospitalist   Name: Nyasia Swartz 84 y.o. female I MRN: [id number]  Unit/Bed#: 2 E 279-01 I Date of Admission: 3/31/2025   Date of Service: 4/1/2025 I Hospital Day: 0    Assessment & Plan  Unresponsive episode  Patient presenting to the ED with an unresponsive episode after eating. Per son at bedside, patient was in her normal health eating breakfast 3/31. After breakfast patient had unresponsive episode and bout of vomiting. Has since returned to baseline  CT brain in ER negative  Cardiac workup: Troponin elevated from 51 > 64, EKG without ischemic changes  Patient received aspirin 324 mg once and will be admitted for further management  Suspect reflex syncope, r/o acs  Continue on telemetry  Continue aspirin 81 mg daily  Lipid panel   Cholesterol: 215, triglycerides 169,  -- no existing cardiac history. Will defer statin to PCP  ECHO pending   Cardiology consulted, appreciate recs   Consider neurologic cause  Elevated trops thought to be 2/2 hypertensive episode/unresponsive episode   Restarted on norvasc 5mg daily for elevated BP  F/u ECHO  Cardiology signed off  Elevated troponin level  Thought to be 2/2 hypertensive episode/unresponsive episode   Duodenitis  Patient presented with vomiting episode, has abdominal distension and guarding on exam   CT abd/pelvis notes duodenitis, Significant gaseous distention of the colon without evidence of bowel obstruction or colitis.   Patient is non-tender to palpation throughout although does have decreased bowel sounds  Supportive treatment for now  IV protonix  Tolerating regular diet   Zofran as needed  Patient without further episodes of vomiting, tolerating oral diet without difficulty.   Hypertension  Patient has known hx of HTN, was prescribed lisinopril and amlodipine in the past but does not take any medications   BP elevated   Resumed on amlodipine 5mg daily 4/1 per cardiology -- adjust as needed  Dementia without behavioral disturbance  (HCC)  Not currently on medication  Deliriogenic and fall precautions  Per son, she does say things here and there but largely nonverbal - he reports she is back to baseline mentation     VTE Pharmacologic Prophylaxis:   Moderate Risk (Score 3-4) - Pharmacological DVT Prophylaxis Ordered: heparin.    Mobility:   Basic Mobility Inpatient Raw Score: 18  JH-HLM Goal: 6: Walk 10 steps or more  JH-HLM Achieved: 5: Stand (1 or more minutes)  JH-HLM Goal NOT achieved. Continue with multidisciplinary rounding and encourage appropriate mobility to improve upon JH-HLM goals.    Patient Centered Rounds: I performed bedside rounds with nursing staff today.   Discussions with Specialists or Other Care Team Provider: None     Education and Discussions with Family / Patient: Updated  (son) at bedside.    Current Length of Stay: 0 day(s)  Current Patient Status: Observation   Certification Statement: The patient will continue to require additional inpatient hospital stay due to pending ECHO, safe dispo  Discharge Plan: Anticipate discharge tomorrow to home.    Code Status: Level 1 - Full Code    Subjective   Seen and examined. No history obtained from patient. Per son, patient is back to baseline. He reports he was nervous yesterday and called EMS because she has never had an episode like this before. He states that since she has been here, she is back to her baseline. He denies any shaking, incontinence, or tongue biting with the episode. He is aware that we are waiting for the ECHO pending safe discharge. Patient is eating and drinking without difficulty.     Objective :  Temp:  [97.6 °F (36.4 °C)-98.2 °F (36.8 °C)] 97.7 °F (36.5 °C)  HR:  [72-97] 72  BP: (130-185)/() 179/105  Resp:  [15-22] 20  SpO2:  [57 %-99 %] 97 %  O2 Device: None (Room air)    Body mass index is 17.65 kg/m².     Input and Output Summary (last 24 hours):   No intake or output data in the 24 hours ending 04/01/25 0743    Physical  Exam  Constitutional:       General: She is not in acute distress.  HENT:      Head: Normocephalic and atraumatic.      Nose: Nose normal.      Mouth/Throat:      Mouth: Mucous membranes are moist.   Eyes:      Conjunctiva/sclera: Conjunctivae normal.   Cardiovascular:      Rate and Rhythm: Normal rate.      Heart sounds: Normal heart sounds.   Pulmonary:      Effort: Pulmonary effort is normal.      Breath sounds: Normal breath sounds. No wheezing, rhonchi or rales.   Abdominal:      General: There is no distension.      Palpations: Abdomen is soft.      Tenderness: There is no abdominal tenderness. There is no guarding.   Musculoskeletal:      Right lower leg: No edema.      Left lower leg: No edema.   Skin:     General: Skin is warm.   Neurological:      Mental Status: Mental status is at baseline.   Psychiatric:         Mood and Affect: Mood normal.       Lines/Drains:        Telemetry:  Telemetry Orders (From admission, onward)               24 Hour Telemetry Monitoring  Continuous x 24 Hours (Telem)        Expiring   Question:  Reason for 24 Hour Telemetry  Answer:  PCI/EP study (including pacer and ICD implementation), Cardiac surgery, MI, abnormal cardiac cath, and chest pain- rule out MI                     Telemetry Reviewed: NSVT  Indication for Continued Telemetry Use: Syncope               Lab Results: I have reviewed the following results:   Results from last 7 days   Lab Units 04/01/25  0420   WBC Thousand/uL 4.50   HEMOGLOBIN g/dL 13.1   HEMATOCRIT % 41.4   PLATELETS Thousands/uL 208   SEGS PCT % 58   LYMPHO PCT % 28   MONO PCT % 11   EOS PCT % 2     Results from last 7 days   Lab Units 04/01/25  0420 03/31/25  1855   SODIUM mmol/L 139 142   POTASSIUM mmol/L 4.3 4.2   CHLORIDE mmol/L 108 109*   CO2 mmol/L 25 27   BUN mg/dL 16 19   CREATININE mg/dL 1.12 1.19   ANION GAP mmol/L 6 6   CALCIUM mg/dL 9.0 9.8   ALBUMIN g/dL  --  4.1   TOTAL BILIRUBIN mg/dL  --  0.83   ALK PHOS U/L  --  41   ALT U/L  --  12    AST U/L  --  24   GLUCOSE RANDOM mg/dL 99 75                       Recent Cultures (last 7 days):         Imaging Results Review: I reviewed radiology reports from this admission including: CT abdomen/pelvis and CT head.  Other Study Results Review: EKG was reviewed.     Last 24 Hours Medication List:     Current Facility-Administered Medications:     acetaminophen (TYLENOL) tablet 650 mg, Q4H PRN    aspirin chewable tablet 81 mg, Daily    docusate sodium (COLACE) capsule 100 mg, BID    heparin (porcine) subcutaneous injection 5,000 Units, Q12H    metoprolol (LOPRESSOR) injection 5 mg, Q6H PRN    ondansetron (ZOFRAN) injection 4 mg, Q6H PRN    pantoprazole (PROTONIX) injection 40 mg, Q24H ERICH    Administrative Statements   Today, Patient Was Seen By: Maricarmen Villavicencio PA-C    **Please Note: This note may have been constructed using a voice recognition system.**

## 2025-04-01 NOTE — MALNUTRITION/BMI
This medical record reflects one or more clinical indicators suggestive of malnutrition.    Malnutrition Findings:   Adult Malnutrition type: Chronic illness  Adult Degree of Malnutrition: Other severe protein calorie malnutrition  Malnutrition Characteristics: Fat loss, Muscle loss      360 Statement: Related to inadeqaute intake, dementia as evidenced by moderate fat depletion to orbitals and moderate muscle loss to temples. Treated with diet and oral nutrition supplements.    BMI Findings:  Adult BMI Classifications: Underweight < 18.5  Body mass index is 17.65 kg/m².     See Nutrition note dated 4/1/2025 for additional details.  Completed nutrition assessment is viewable in the nutrition documentation.

## 2025-04-01 NOTE — H&P
H&P - Hospitalist   Name: Nyasia Swartz 84 y.o. female I MRN: [id number]  Unit/Bed#: 2 E 279-01 I Date of Admission: 3/31/2025   Date of Service: 3/31/2025 I Hospital Day: 0     Assessment & Plan  Unresponsive episode  Patient presents with ? Unresponsive episode after eating. Shortly after, patient vomited. Has since returned to baseline as per patients son.   CT brain in ER negative  Cardiac workup: Troponin elevated from 51 > 64, EKG shows more pronounced TWI compared to prior  Patient received aspirin 324 mg once and will be admitted for further management  Suspect reflex syncope, r/o acs  Place on telemetry  Continue aspirin 81 mg daily  Continue troponin trend with EKGs   Check lipid panel, TSH, A1C  Check Echo   Patient remains full code, confirmed with son  Cardiology consult   Elevated troponin level  Continue to trend as above  Duodenitis  Patient presented with vomiting episode, has abdominal distension and guarding on exam   CT abd/pelvis notes duodenitis, Significant gaseous distention of the colon without evidence of bowel obstruction or colitis.   Patient is non-tender to palpation throughout although does have decreased bowel sounds  ? Presence of ileus  Supportive treatment for now  IV protonix  Clear liquid diet and advance as tolerated  Zofran as needed  Monitor off antibiotics  If no improvement consider GI input   Hypertension  Patient has known hx of HTN, was prescribed lisinopril and amlodipine in the past but does not take any medications   BP elevated   Metoprolol IVP PRN   Consider restarting home antihypertensives from tomorrow  Dementia without behavioral disturbance (HCC)  Not currently on medication  Deliriogenic and fall precautions      VTE Pharmacologic Prophylaxis:   Moderate Risk (Score 3-4) - Pharmacological DVT Prophylaxis Ordered: heparin.  Code Status: Level 1 - Full Code   Discussion with family: Updated  (son) at bedside.    Anticipated Length of Stay:  Patient will be admitted on an observation basis with an anticipated length of stay of less than 2 midnights secondary to above.    History of Present Illness   Chief Complaint: Unresponsive episode    Nyasia Swartz is a 84 y.o. female with a PMH of advanced dementia, HTN, GERD, Breast CA s/p mastectomy, right adrenal gland mass presents with complaints of unresponsive episode and vomiting. Patient is a poor historian and Stateless creole speaking at baseline, history obtained from son Humphrey at bedside. Patient was in her usual state of health, had breakfast this morning and shortly after was noted to increase belching episodes. Shortly after patient shrieked and then slumped over to the side into the chair. Patient appeared disoriented and then began vomiting. No blood noted in vomitus. For a few minutes patient remained altered however slowly came to. No loss of consciousness noted. Patient unable to voice her complaints so it is not known if patient had chest pain or not. By the time came to the ER, she appeared to be back to her baseline. Per patients son, there has been no fevers, diarrhea, constipation. She is incontinent at baseline with no recent changes in amount of urine in depends. She was diagnosed with HTN in the past but has not been on medication for months.     Review of Systems   Unable to perform ROS: Dementia       Historical Information   Past Medical History:   Diagnosis Date    Acute kidney injury (HCC) 1/3/2022    Dementia (HCC)     Hypertension      History reviewed. No pertinent surgical history.  Social History     Tobacco Use    Smoking status: Never    Smokeless tobacco: Never   Vaping Use    Vaping status: Never Used   Substance and Sexual Activity    Alcohol use: Never    Drug use: Never    Sexual activity: Not on file     E-Cigarette/Vaping    E-Cigarette Use Never User      E-Cigarette/Vaping Substances    Nicotine No     THC No     CBD No     Flavoring No     Other No     Unknown No       Family History   Problem Relation Age of Onset    No Known Problems Mother     No Known Problems Father      Social History:  Marital Status:      Meds/Allergies   I have reviewed home medications with patient family member.  Prior to Admission medications    Medication Sig Start Date End Date Taking? Authorizing Provider   amLODIPine (NORVASC) 5 mg tablet Take 1 tablet (5 mg total) by mouth daily  Patient not taking: Reported on 11/23/2023 10/8/21 3/31/25  KAREN Bernal   omeprazole (PriLOSEC) 40 MG capsule Take 1 capsule (40 mg total) by mouth 2 (two) times a day 11/24/23 3/31/25  Jamie Lainez MD   senna (SENOKOT) 8.6 mg Take 1 tablet (8.6 mg total) by mouth daily  Patient not taking: Reported on 11/23/2023 1/5/22 3/31/25  KAREN Nolasco     No Known Allergies    Objective :  Temp:  [98.1 °F (36.7 °C)] 98.1 °F (36.7 °C)  HR:  [85-97] 96  BP: (130-173)/() 168/112  Resp:  [15-22] 20  SpO2:  [91 %-98 %] 97 %  O2 Device: None (Room air)    Physical Exam  Vitals and nursing note reviewed.   Constitutional:       General: She is not in acute distress.     Appearance: She is well-developed. She is ill-appearing. She is not toxic-appearing or diaphoretic.   HENT:      Head: Normocephalic and atraumatic.      Nose: No rhinorrhea.   Eyes:      General: No scleral icterus.        Right eye: No discharge.         Left eye: No discharge.      Conjunctiva/sclera: Conjunctivae normal.   Cardiovascular:      Rate and Rhythm: Normal rate and regular rhythm.      Heart sounds: No murmur heard.  Pulmonary:      Effort: Pulmonary effort is normal. No respiratory distress.      Breath sounds: Normal breath sounds. No wheezing, rhonchi or rales.   Chest:      Chest wall: No tenderness.   Abdominal:      General: A surgical scar is present. Bowel sounds are decreased. There is distension.      Palpations: Abdomen is soft.      Tenderness: There is no abdominal tenderness. There is guarding.  "Negative signs include Hitchcock's sign.   Musculoskeletal:         General: No swelling.      Cervical back: No rigidity or tenderness.      Right lower leg: No edema.      Left lower leg: No edema.   Skin:     General: Skin is warm and dry.      Capillary Refill: Capillary refill takes less than 2 seconds.      Findings: No rash.   Neurological:      General: No focal deficit present.      Mental Status: She is alert. Mental status is at baseline.      Motor: No weakness.   Psychiatric:         Mood and Affect: Mood normal.       Lines/Drains:            Lab Results: I have reviewed the following results:  Results from last 7 days   Lab Units 03/31/25  1855   WBC Thousand/uL 4.18*   HEMOGLOBIN g/dL 12.3   HEMATOCRIT % 39.6   PLATELETS Thousands/uL 200   SEGS PCT % 47   LYMPHO PCT % 37   MONO PCT % 11   EOS PCT % 4     Results from last 7 days   Lab Units 03/31/25  1855   SODIUM mmol/L 142   POTASSIUM mmol/L 4.2   CHLORIDE mmol/L 109*   CO2 mmol/L 27   BUN mg/dL 19   CREATININE mg/dL 1.19   ANION GAP mmol/L 6   CALCIUM mg/dL 9.8   ALBUMIN g/dL 4.1   TOTAL BILIRUBIN mg/dL 0.83   ALK PHOS U/L 41   ALT U/L 12   AST U/L 24   GLUCOSE RANDOM mg/dL 75             No results found for: \"HGBA1C\"        Imaging Results Review: I reviewed radiology reports from this admission including: CT chest and CT abdomen/pelvis.  Other Study Results Review: EKG was reviewed.     Administrative Statements   I have spent a total time of 70 minutes in caring for this patient on the day of the visit/encounter including Instructions for management.    ** Please Note: This note has been constructed using a voice recognition system. **    "

## 2025-04-01 NOTE — CONSULTS
Consultation - Cardiology   Name: Nyasia Swartz 84 y.o. female I MRN: [id number]  Unit/Bed#: 2 E 279-01 I Date of Admission: 3/31/2025   Date of Service: 4/1/2025 I Hospital Day: 0   Inpatient consult to Cardiology  Consult performed by: Rachelle Acuna PA-C  Consult ordered by: Mavis Ferguson MD        Physician Requesting Evaluation: Stanley Wong MD   Reason for Evaluation / Principal Problem: +trop, EKG changes, unresponsiveness    Assessment & Plan  Elevated troponin level  Troponins: 51/64/85  Nonischemic myocardial injury secondary to hypertension, unresponsive episode  Echocardiogram pending  Hypertension  Elevated at times up to 185/108  Currently 179/105  Was NOT taking BP meds at home  Start Norvasc 5mg qd  Unresponsive episode  Unclear etiology;  ?vasovagal  Monitor vital signs  Echo pending  Dementia without behavioral disturbance (HCC)  Mgmt per SLIM  Duodenitis  Mgmt per SLIM  I have discussed the above management plan in detail with the primary service.   Cardiology service signing off.    History of Present Illness   Nyasia Swartz is a 84 y.o. female who presents with unresponsive episode.  Pt is a poor historian and and has baseline dementia.  She was in her usual state of health having breakfast then suddenly noted increased belching, streaked and then slumped over on the side of her chair.  Patient was disoriented and then began vomiting.  Later patient remained altered but was slowly coming to.  There was no loss of consciousness.  History is obtained from the son who is at the bedside at the time of interview per [facility].  Currently patient lying in bed appears comfortable in no acute distress.  Son at the bedside.  Son gives the history.  Patient does not interact, baseline dementia. Appears comfortable.     Review of Systems   Unable to perform ROS: Dementia     Medical History Review: I have reviewed the patient's PMH, PSH, Social History, Family History, Meds, and Allergies      Objective :  Temp:  [97.6 °F (36.4 °C)-98.2 °F (36.8 °C)] 98.1 °F (36.7 °C)  HR:  [72-97] 72  BP: (130-185)/() 179/105  Resp:  [15-22] 16  SpO2:  [57 %-99 %] 99 %  O2 Device: None (Room air)  Orthostatic Blood Pressures      Flowsheet Row Most Recent Value   Blood Pressure 179/105 filed at 04/01/2025 0722   Patient Position - Orthostatic VS Lying filed at 04/01/2025 0722          First Weight: Weight - Scale: 48.1 kg (106 lb 0.7 oz) (03/31/25 2300)  Vitals:    03/31/25 2300   Weight: 48.1 kg (106 lb 0.7 oz)       Physical Exam  Vitals and nursing note reviewed. Exam conducted with a chaperone present.   Constitutional:       Appearance: Normal appearance.   HENT:      Head: Normocephalic and atraumatic.   Cardiovascular:      Rate and Rhythm: Normal rate and regular rhythm.      Pulses: Normal pulses.      Heart sounds: Normal heart sounds.   Pulmonary:      Effort: Pulmonary effort is normal.      Breath sounds: Normal breath sounds.   Musculoskeletal:         General: Normal range of motion.      Cervical back: Normal range of motion and neck supple.   Skin:     General: Skin is warm and dry.   Neurological:      Mental Status: She is alert. Mental status is at baseline.           Lab Results: I have reviewed the following results:CBC/BMP:   .     04/01/25 0420   WBC 4.50   HGB 13.1   HCT 41.4      SODIUM 139   K 4.3      CO2 25   BUN 16   CREATININE 1.12   GLUC 99   MG 2.0    , Creatinine Clearance: Estimated Creatinine Clearance: 29.4 mL/min (by C-G formula based on SCr of 1.12 mg/dL)., LFTs:   .     03/31/25 1855   AST 24   ALT 12   ALB 4.1   TBILI 0.83   ALKPHOS 41    , PTT/INR:No new results in last 24 hours. , Troponin,BNP:  .     03/31/25 1855 03/31/25  2104   HSTNI0 51*  --    HSTNI2  --  64*    , Lactic Acid: No new results in last 24 hours.   Results from last 7 days   Lab Units 04/01/25 0420 03/31/25  1855   WBC Thousand/uL 4.50 4.18*   HEMOGLOBIN g/dL 13.1 12.3   HEMATOCRIT %  "41.4 39.6   PLATELETS Thousands/uL 208 200     Results from last 7 days   Lab Units 04/01/25  0420 03/31/25  1855   POTASSIUM mmol/L 4.3 4.2   CHLORIDE mmol/L 108 109*   CO2 mmol/L 25 27   BUN mg/dL 16 19   CREATININE mg/dL 1.12 1.19   CALCIUM mg/dL 9.0 9.8         No results found for: \"HGBA1C\"  Lab Results   Component Value Date    TROPONINI <0.02 09/27/2020       Imaging Results Review: I reviewed radiology reports from this admission including: chest xray, CT chest, CT abdomen/pelvis, and CT head.  Other Study Results Review: EKG was reviewed.       "

## 2025-04-01 NOTE — ASSESSMENT & PLAN NOTE
Patient has known hx of HTN, was prescribed lisinopril and amlodipine in the past but does not take any medications   BP elevated   Metoprolol IVP PRN   Consider restarting home antihypertensives from tomorrow

## 2025-04-01 NOTE — PLAN OF CARE
Problem: PAIN - ADULT  Goal: Verbalizes/displays adequate comfort level or baseline comfort level  Description: Interventions:- Encourage patient to monitor pain and request assistance- Assess pain using appropriate pain scale- Administer analgesics based on type and severity of pain and evaluate response- Implement non-pharmacological measures as appropriate and evaluate response- Consider cultural and social influences on pain and pain management- Notify physician/advanced practitioner if interventions unsuccessful or patient reports new pain  Outcome: Progressing     Problem: INFECTION - ADULT  Goal: Absence or prevention of progression during hospitalization  Description: INTERVENTIONS:- Assess and monitor for signs and symptoms of infection- Monitor lab/diagnostic results- Monitor all insertion sites, i.e. indwelling lines, tubes, and drains- Monitor endotracheal if appropriate and nasal secretions for changes in amount and color- [place] appropriate cooling/warming therapies per order- Administer medications as ordered- Instruct and encourage patient and family to use good hand hygiene technique- Identify and instruct in appropriate isolation precautions for identified infection/condition  Outcome: Progressing  Goal: Absence of fever/infection during neutropenic period  Description: INTERVENTIONS:- Monitor WBC  Outcome: Progressing     Problem: SAFETY ADULT  Goal: Patient will remain free of falls  Description: INTERVENTIONS:- Educate patient/family on patient safety including physical limitations- Instruct patient to call for assistance with activity - Consult OT/PT to assist with strengthening/mobility - Keep Call bell within reach- Keep bed low and locked with side rails adjusted as appropriate- Keep care items and personal belongings within reach- Initiate and maintain comfort rounds- Make Fall Risk Sign visible to staff- Offer Toileting every 2 Hours, in advance of need- Initiate/Maintain bed/chair  alarm- Obtain necessary fall risk management equipment- Apply yellow socks and bracelet for high fall risk patients- Consider moving patient to room near nurses station  Outcome: Progressing  Goal: Maintain or return to baseline ADL function  Description: INTERVENTIONS:-  Assess patient's ability to carry out ADLs; assess patient's baseline for ADL function and identify physical deficits which impact ability to perform ADLs (bathing, care of mouth/teeth, toileting, grooming, dressing, etc.)- Assess/evaluate cause of self-care deficits - Assess range of motion- Assess patient's mobility; develop plan if impaired- Assess patient's need for assistive devices and provide as appropriate- Encourage maximum independence but intervene and supervise when necessary- Involve family in performance of ADLs- Assess for home care needs following discharge - Consider OT consult to assist with ADL evaluation and planning for discharge- Provide patient education as appropriate  Outcome: Progressing  Goal: Maintains/Returns to pre admission functional level  Description: INTERVENTIONS:- Perform AM-PAC 6 Click Basic Mobility/ Daily Activity assessment daily.- Set and communicate daily mobility goal to care team and patient/family/caregiver. - Collaborate with rehabilitation services on mobility goals if consulted- Perform Range of Motion 2 times a day.- Reposition patient every 2 hours.- Dangle patient 2 times a day- Stand patient 2 times a day- Ambulate patient 2 times a day- Out of bed to chair 2 times a day - Out of bed for meals 2 times a day- Out of bed for toileting- Record patient progress and toleration of activity level   Outcome: Progressing     Problem: DISCHARGE PLANNING  Goal: Discharge to home or other facility with appropriate resources  Description: INTERVENTIONS:- Identify barriers to discharge w/patient and caregiver- Arrange for needed discharge resources and transportation as appropriate- Identify discharge learning  needs (meds, wound care, etc.)- Arrange for interpretive services to assist at discharge as needed- Refer to Case Management Department for coordinating discharge planning if the patient needs post-hospital services based on physician/advanced practitioner order or complex needs related to functional status, cognitive ability, or social support system  Outcome: Progressing     Problem: Knowledge Deficit  Goal: Patient/family/caregiver demonstrates understanding of disease process, treatment plan, medications, and discharge instructions  Description: Complete learning assessment and assess knowledge base.Interventions:- Provide teaching at level of understanding- Provide teaching via preferred learning methods  Outcome: Progressing

## 2025-04-01 NOTE — ASSESSMENT & PLAN NOTE
Elevated at times up to 185/108  Currently 179/105  Was NOT taking BP meds at home  Start Norvasc 5mg qd

## 2025-04-01 NOTE — PLAN OF CARE
Problem: PAIN - ADULT  Goal: Verbalizes/displays adequate comfort level or baseline comfort level  Description: Interventions:- Encourage patient to monitor pain and request assistance- Assess pain using appropriate pain scale- Administer analgesics based on type and severity of pain and evaluate response- Implement non-pharmacological measures as appropriate and evaluate response- Consider cultural and social influences on pain and pain management- Notify physician/advanced practitioner if interventions unsuccessful or patient reports new pain  Outcome: Progressing     Problem: INFECTION - ADULT  Goal: Absence or prevention of progression during hospitalization  Description: INTERVENTIONS:- Assess and monitor for signs and symptoms of infection- Monitor lab/diagnostic results- Monitor all insertion sites, i.e. indwelling lines, tubes, and drains- Monitor endotracheal if appropriate and nasal secretions for changes in amount and color- [place] appropriate cooling/warming therapies per order- Administer medications as ordered- Instruct and encourage patient and family to use good hand hygiene technique- Identify and instruct in appropriate isolation precautions for identified infection/condition  Outcome: Progressing  Goal: Absence of fever/infection during neutropenic period  Description: INTERVENTIONS:- Monitor WBC  Outcome: Progressing     Problem: DISCHARGE PLANNING  Goal: Discharge to home or other facility with appropriate resources  Description: INTERVENTIONS:- Identify barriers to discharge w/patient and caregiver- Arrange for needed discharge resources and transportation as appropriate- Identify discharge learning needs (meds, wound care, etc.)- Arrange for interpretive services to assist at discharge as needed- Refer to Case Management Department for coordinating discharge planning if the patient needs post-hospital services based on physician/advanced practitioner order or complex needs related to  functional status, cognitive ability, or social support system  Outcome: Progressing     Problem: Knowledge Deficit  Goal: Patient/family/caregiver demonstrates understanding of disease process, treatment plan, medications, and discharge instructions  Description: Complete learning assessment and assess knowledge base.Interventions:- Provide teaching at level of understanding- Provide teaching via preferred learning methods  Outcome: Progressing

## 2025-04-01 NOTE — ASSESSMENT & PLAN NOTE
Patient presented with vomiting episode, has abdominal distension and guarding on exam   CT abd/pelvis notes duodenitis, Significant gaseous distention of the colon without evidence of bowel obstruction or colitis.   Patient is non-tender to palpation throughout although does have decreased bowel sounds  ? Presence of ileus  Supportive treatment for now  IV protonix  Clear liquid diet and advance as tolerated  Zofran as needed  Monitor off antibiotics  If no improvement consider GI input    This came over, pt has a NEW PT apt on 9-9-21.    Did you want to address this or have him come in sooner or have him see Jigna Bautista ?

## 2025-04-01 NOTE — CASE MANAGEMENT
Case Management Assessment & Discharge Planning Note    Patient name Nyasia Swartz  Location 2 [facility] 279/2 E 279-01 MRN [id number]  : 1940 Date 2025       Current Admission Date: 3/31/2025  Current Admission Diagnosis:Unresponsive episode   Patient Active Problem List    Diagnosis Date Noted Date Diagnosed    Severe protein-calorie malnutrition (HCC) 2025     Unresponsive episode 2025     Duodenitis 2025     Gastritis 2023     Sigmoid volvulus (HCC) 2022     Syncope 2022     Elevated troponin level 2022     Spinal stenosis of lumbar region 2021     Dementia without behavioral disturbance (HCC) 2021     Hypertension 2021     Sterile pyuria 2021     Chronic bilateral low back pain 12/15/2020     Mild cognitive impairment with memory loss 12/15/2020     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     Urinary retention 2020     Acute encephalopathy 2020     Generalized weakness 2020     Hypokalemia 2020     Constipation 2020     Hypertensive urgency 2020       LOS (days): 0  Geometric Mean LOS (GMLOS) (days):   Days to GMLOS:     OBJECTIVE:     Current admission status: Observation       Preferred Pharmacy:   [id number] [place]  [address]  [address]  [facility] [address]  Phone: [phone] Fax: [phone]    [id number] [address] [address]  Phone: [phone] Fax: [phone]    [facility]pharmacy #[id number] [facility] [place] - [address] [address]  Phone: [phone] Fax: [phone]    Primary Care Provider: No primary care provider on file.    Primary Insurance: [facility] [place] DUAL COMPLETE MC REP  Secondary Insurance:     ASSESSMENT:  Active Health Care Proxies       Queenie Costa Health Care Agent - Daughter   Primary Phone:  Adult Annual Physical  Name: Néstor Campos      : 1980      MRN: [id number]  Encounter Provider: Ivonne Pena MD  Encounter Date: 9/3/2024   Encounter department: [facility]    Assessment & Plan   1. Dysphagia, unspecified type  Symptoms are non specific, exam unremarkable. Symptoms seem like reflux maybe LPR. Will test for hpylori and start pepcid and tums. If symptoms do not resolve and hpylori negative will need GI referral for EGD due to possible stricture causing dysphagia. Denies URI symptoms and home covid negative.   -     H. pylori antigen, stool; Future  -     calcium carbonate (TUMS) 500 mg chewable tablet; Chew 1 tablet (500 mg total) daily for 10 days  -     famotidine (PEPCID) 20 mg tablet; Take 1 tablet (20 mg total) by mouth 2 (two) times a day  2. Annual physical exam  -     CBC and differential; Future  -     Basic metabolic panel; Future  -     Lipid Panel with Direct LDL reflex; Future  -     Hepatitis C antibody; Future  -     Hemoglobin A1C; Future    Immunizations and preventive care screenings were discussed with patient today. Appropriate education was printed on patient's after visit summary.        Counseling:  Alcohol/drug use: discussed moderation in alcohol intake, the recommendations for healthy alcohol use, and avoidance of illicit drug use.  Sexual health: discussed sexually transmitted diseases, partner selection, use of condoms, avoidance of unintended pregnancy, and contraceptive alternatives.    BMI Counseling: Body mass index is 28.32 kg/m². The BMI is above normal. Nutrition recommendations include decreasing portion sizes. Exercise recommendations include moderate physical activity 150 minutes/week. No pharmacotherapy was ordered. Patient referred to PCP. Rationale for BMI follow-up plan is due to patient being overweight or obese.     Depression Screening and Follow-up Plan: Patient was screened for depression during  [phone] (Mobile)                 Advance Directives  Does patient have a Health Care POA?: Yes  Does patient have Advance Directives?: Yes  Advance Directives: Power of  for health care, Power of  for finance  Primary Contact: Patient's Daughter (Queenie)    Readmission Root Cause  30 Day Readmission: No    Patient Information  Admitted from:: Home  Mental Status: Confused  During Assessment patient was accompanied by: Not accompanied during assessment  Assessment information provided by:: Daughter  Primary Caregiver: Family  Caregiver's Name:: Maryam  Caregiver's Relationship to Patient:: Family Member (Son and Daughter)  Caregiver's Telephone Number:: [phone]  Support Systems: Self, Son, Daughter  County of Residence: Other (specify in comment box) ([facility])  What city do you live in?: [place], [place]  Home entry access options. Select all that apply.: Stairs  Number of steps to enter home.: 3  Do the steps have railings?: Yes  Type of Current Residence: [facility]  Living Arrangements: Lives w/ Daughter  Is patient a ?: No    Activities of Daily Living Prior to Admission  Functional Status: Assistance  Completes ADLs independently?: No  Level of ADL dependence: Assistance  Ambulates independently?: Yes  Does patient use assisted devices?: No  Does patient currently own DME?: No  Does patient have a history of Outpatient Therapy (PT/OT)?: No  Does the patient have a history of Short-Term Rehab?: No  Does patient have a history of HHC?: Yes  Does patient currently have HHC?: No    Patient Information Continued  Income Source: SSI/SSD  Does patient have prescription coverage?: Yes  Can the patient afford their medications and any related supplies (such as glucometers or test strips)?: Yes  Does patient receive dialysis treatments?: No  Does patient have a history of substance abuse?: No  Does patient have a history of Mental Health Diagnosis?: No    Means of Transportation  Means  today's encounter. They screened negative with a PHQ-2 score of 0.        History of Present Illness       43-year-old male with no significant medical history presenting for annual physical.  Patient states that for the last week he has been having right-sided chest discomfort for which he describes a feeling like something is stuck in his esophagus.  He states that he wakes up in the morning with a feeling of discomfort while he swallows and some mucus production.  Has not had any chest pain does not have shortness of breath nasal congestion runny nose watery eyes and did take a COVID test outpatient and was negative.  Denies any sick contacts.  Denies recent travel.  States that it is not difficult actually swallowing and he is able to swallow food and liquids without issues but after he eats notes a weird almost nauseous feeling in his throat. Denies burning in his chest or classic reflux symptoms. Denies smoking, alcohol use. Denies fever/chills.       Adult Annual Physical:  Patient presents for annual physical.     Diet and Physical Activity:  - Diet/Nutrition: well balanced diet.  - Exercise: no formal exercise.    Depression Screening:  - PHQ-2 Score: 0    General Health:  - Sleep: sleeps well.  - Hearing: normal hearing bilateral ears.  - Vision: no vision problems.  - Dental: regular dental visits.     Health:  - History of STDs: no.   - Urinary symptoms: none.     Advanced Care Planning:  - Has an advanced directive?: no    - Has a durable medical POA?: no    - ACP document given to patient?: no        Review of Systems   Constitutional:  Negative for chills and fever.   HENT:  Negative for ear pain and sore throat.    Eyes:  Negative for pain and visual disturbance.   Respiratory:  Positive for chest tightness. Negative for cough and shortness of breath.    Cardiovascular:  Negative for chest pain and palpitations.   Gastrointestinal:  Positive for nausea. Negative for abdominal distention, abdominal  "pain, anal bleeding, blood in stool, constipation, diarrhea, rectal pain and vomiting.   Genitourinary:  Negative for dysuria and hematuria.   Musculoskeletal:  Negative for arthralgias and back pain.   Skin:  Negative for color change and rash.   Neurological:  Negative for seizures and syncope.   All other systems reviewed and are negative.          Objective     /74 (BP Location: Right arm, Patient Position: Sitting, Cuff Size: Standard)   Pulse 67   Temp 97.9 °F (36.6 °C) (Temporal)   Ht 5' 10\" (1.778 m)   Wt 89.5 kg (197 lb 6 oz)   SpO2 99%   BMI 28.32 kg/m²     Physical Exam  Vitals and nursing note reviewed.   Constitutional:       General: He is not in acute distress.     Appearance: He is well-developed.   HENT:      Head: Normocephalic and atraumatic.   Eyes:      Conjunctiva/sclera: Conjunctivae normal.   Cardiovascular:      Rate and Rhythm: Normal rate and regular rhythm.      Heart sounds: No murmur heard.  Pulmonary:      Effort: Pulmonary effort is normal. No respiratory distress.      Breath sounds: Normal breath sounds.   Abdominal:      Palpations: Abdomen is soft.      Tenderness: There is no abdominal tenderness.   Musculoskeletal:         General: No swelling.      Cervical back: Neck supple.      Right lower leg: No edema.      Left lower leg: No edema.   Skin:     General: Skin is warm and dry.      Capillary Refill: Capillary refill takes less than 2 seconds.   Neurological:      General: No focal deficit present.      Mental Status: He is alert and oriented to person, place, and time.   Psychiatric:         Mood and Affect: Mood normal.           " of Transport to Appts:: Family transport    DISCHARGE DETAILS:    Discharge planning discussed with:: Patient's Daughter  Freedom of Choice: Yes  Comments - Freedom of Choice: CM discussed FOC as it pertains to discharge planning. Patient's daughter reported that patient's son is her caregiver daily while daughter is away at work. When she returns home, she takes over for patient's care. Patient requires assistance with all ADLs except mobility. Patient is ambulatory and has had 0 falls at home. Patient's daughter just noted that, due to worsening dementia, patient requires more assistance with daily activities, but her and her brother have a good routine of taking turns caring for her. Patient's daughter is not interested in STR or VNA at this time and prefers that patient returns home. She will pick patient up at the time of discharge.  CM contacted family/caregiver?: Yes  Were Treatment Team discharge recommendations reviewed with patient/caregiver?: Yes  Did patient/caregiver verbalize understanding of patient care needs?: Yes  Were patient/caregiver advised of the risks associated with not following Treatment Team discharge recommendations?: Yes    Contacts  Patient Contacts: Queenie  Relationship to Patient:: Family  Contact Method: Phone  Phone Number: [phone]  Reason/Outcome: Continuity of Care, Discharge Planning    Requested Home Health Care         Is the patient interested in HHC at discharge?: No    DME Referral Provided  Referral made for DME?: No    Other Referral/Resources/Interventions Provided:  Interventions: None Indicated    Would you like to participate in our [facility] service program?  : No - Declined    Treatment Team Recommendation: Home  Discharge Destination Plan:: Home  Transport at Discharge : Family

## 2025-04-01 NOTE — ASSESSMENT & PLAN NOTE
Patient presenting to the ED with an unresponsive episode after eating. Per son at bedside, patient was in her normal health eating breakfast 3/31. After breakfast patient had unresponsive episode and bout of vomiting. Has since returned to baseline  CT brain in ER negative  Cardiac workup: Troponin elevated from 51 > 64, EKG without ischemic changes  Patient received aspirin 324 mg once and will be admitted for further management  Suspect reflex syncope, r/o acs  Continue on telemetry  Continue aspirin 81 mg daily  Lipid panel   Cholesterol: 215, triglycerides 169,  -- no existing cardiac history. Will defer statin to PCP  ECHO pending   Cardiology consulted, appreciate recs   Consider neurologic cause  Elevated trops thought to be 2/2 hypertensive episode/unresponsive episode   Restarted on norvasc 5mg daily for elevated BP  F/u ECHO  Cardiology signed off

## 2025-04-01 NOTE — ASSESSMENT & PLAN NOTE
Patient has known hx of HTN, was prescribed lisinopril and amlodipine in the past but does not take any medications   BP elevated   Resumed on amlodipine 5mg daily 4/1 per cardiology -- adjust as needed

## 2025-04-01 NOTE — ASSESSMENT & PLAN NOTE
Patient presents with ? Unresponsive episode after eating. Shortly after, patient vomited. Has since returned to baseline as per patients son.   CT brain in ER negative  Cardiac workup: Troponin elevated from 51 > 64, EKG shows more pronounced TWI compared to prior  Patient received aspirin 324 mg once and will be admitted for further management  Suspect reflex syncope, r/o acs  Place on telemetry  Continue aspirin 81 mg daily  Continue troponin trend with EKGs   Check lipid panel, TSH, A1C  Check Echo   Patient remains full code, confirmed with son  Cardiology consult

## 2025-04-01 NOTE — ASSESSMENT & PLAN NOTE
Patient presented with vomiting episode, has abdominal distension and guarding on exam   CT abd/pelvis notes duodenitis, Significant gaseous distention of the colon without evidence of bowel obstruction or colitis.   Patient is non-tender to palpation throughout although does have decreased bowel sounds  Supportive treatment for now  IV protonix  Tolerating regular diet   Zofran as needed  Patient without further episodes of vomiting, tolerating oral diet without difficulty.

## 2025-04-01 NOTE — ASSESSMENT & PLAN NOTE
Not currently on medication  Deliriogenic and fall precautions  Per son, she does say things here and there but largely nonverbal - he reports she is back to baseline mentation

## 2025-04-02 ENCOUNTER — APPOINTMENT (INPATIENT)
Dept: NON INVASIVE DIAGNOSTICS | Facility: HOSPITAL | Age: 85
DRG: 391 | End: 2025-04-02
Payer: COMMERCIAL

## 2025-04-02 VITALS
TEMPERATURE: 97.8 F | OXYGEN SATURATION: 99 % | RESPIRATION RATE: 17 BRPM | WEIGHT: 106 LBS | BODY MASS INDEX: 17.66 KG/M2 | SYSTOLIC BLOOD PRESSURE: 165 MMHG | HEIGHT: 65 IN | DIASTOLIC BLOOD PRESSURE: 96 MMHG | HEART RATE: 75 BPM

## 2025-04-02 LAB — BSA FOR ECHO PROCEDURE: 1.51 M2

## 2025-04-02 PROCEDURE — 93306 TTE W/DOPPLER COMPLETE: CPT | Performed by: INTERNAL MEDICINE

## 2025-04-02 PROCEDURE — 99239 HOSP IP/OBS DSCHRG MGMT >30: CPT | Performed by: INTERNAL MEDICINE

## 2025-04-02 PROCEDURE — 93306 TTE W/DOPPLER COMPLETE: CPT

## 2025-04-02 RX ORDER — PANTOPRAZOLE SODIUM 40 MG/1
40 TABLET, DELAYED RELEASE ORAL DAILY
Qty: 30 TABLET | Refills: 0 | Status: SHIPPED | OUTPATIENT
Start: 2025-04-02

## 2025-04-02 RX ORDER — ASPIRIN 81 MG/1
81 TABLET, CHEWABLE ORAL DAILY
Qty: 30 TABLET | Refills: 0 | Status: SHIPPED | OUTPATIENT
Start: 2025-04-03

## 2025-04-02 RX ORDER — AMLODIPINE BESYLATE 5 MG/1
5 TABLET ORAL DAILY
Qty: 30 TABLET | Refills: 0 | Status: SHIPPED | OUTPATIENT
Start: 2025-04-03

## 2025-04-02 RX ADMIN — DOCUSATE SODIUM 100 MG: 100 CAPSULE, LIQUID FILLED ORAL at 09:06

## 2025-04-02 RX ADMIN — AMLODIPINE BESYLATE 5 MG: 5 TABLET ORAL at 09:06

## 2025-04-02 RX ADMIN — ASPIRIN 81 MG: 81 TABLET, CHEWABLE ORAL at 09:06

## 2025-04-02 RX ADMIN — HEPARIN SODIUM 5000 UNITS: 5000 INJECTION INTRAVENOUS; SUBCUTANEOUS at 05:36

## 2025-04-02 NOTE — PROGRESS NOTES
Patient discharged home with son. Son verbalized understanding of discharge AVS. Seen by SLIM prior to discharge. VSS.

## 2025-04-02 NOTE — PLAN OF CARE
Problem: PAIN - ADULT  Goal: Verbalizes/displays adequate comfort level or baseline comfort level  Description: Interventions:- Encourage patient to monitor pain and request assistance- Assess pain using appropriate pain scale- Administer analgesics based on type and severity of pain and evaluate response- Implement non-pharmacological measures as appropriate and evaluate response- Consider cultural and social influences on pain and pain management- Notify physician/advanced practitioner if interventions unsuccessful or patient reports new pain  Outcome: Progressing     Problem: INFECTION - ADULT  Goal: Absence or prevention of progression during hospitalization  Description: INTERVENTIONS:- Assess and monitor for signs and symptoms of infection- Monitor lab/diagnostic results- Monitor all insertion sites, i.e. indwelling lines, tubes, and drains- Monitor endotracheal if appropriate and nasal secretions for changes in amount and color- [place] appropriate cooling/warming therapies per order- Administer medications as ordered- Instruct and encourage patient and family to use good hand hygiene technique- Identify and instruct in appropriate isolation precautions for identified infection/condition  Outcome: Progressing  Goal: Absence of fever/infection during neutropenic period  Description: INTERVENTIONS:- Monitor WBC  Outcome: Progressing     Problem: SAFETY ADULT  Goal: Patient will remain free of falls  Description: INTERVENTIONS:- Educate patient/family on patient safety including physical limitations- Instruct patient to call for assistance with activity - Consult OT/PT to assist with strengthening/mobility - Keep Call bell within reach- Keep bed low and locked with side rails adjusted as appropriate- Keep care items and personal belongings within reach- Initiate and maintain comfort rounds- Make Fall Risk Sign visible to staff- Offer Toileting every 2 Hours, in advance of need- Initiate/Maintain bed/chair  alarm- Obtain necessary fall risk management equipment- Apply yellow socks and bracelet for high fall risk patients- Consider moving patient to room near nurses station  Outcome: Progressing  Goal: Maintain or return to baseline ADL function  Description: INTERVENTIONS:-  Assess patient's ability to carry out ADLs; assess patient's baseline for ADL function and identify physical deficits which impact ability to perform ADLs (bathing, care of mouth/teeth, toileting, grooming, dressing, etc.)- Assess/evaluate cause of self-care deficits - Assess range of motion- Assess patient's mobility; develop plan if impaired- Assess patient's need for assistive devices and provide as appropriate- Encourage maximum independence but intervene and supervise when necessary- Involve family in performance of ADLs- Assess for home care needs following discharge - Consider OT consult to assist with ADL evaluation and planning for discharge- Provide patient education as appropriate  Outcome: Progressing  Goal: Maintains/Returns to pre admission functional level  Description: INTERVENTIONS:- Perform AM-PAC 6 Click Basic Mobility/ Daily Activity assessment daily.- Set and communicate daily mobility goal to care team and patient/family/caregiver. - Collaborate with rehabilitation services on mobility goals if consulted- Perform Range of Motion 2 times a day.- Reposition patient every 2 hours.- Dangle patient 2 times a day- Stand patient 2 times a day- Ambulate patient 2 times a day- Out of bed to chair 2 times a day - Out of bed for meals 2 times a day- Out of bed for toileting- Record patient progress and toleration of activity level   Outcome: Progressing     Problem: DISCHARGE PLANNING  Goal: Discharge to home or other facility with appropriate resources  Description: INTERVENTIONS:- Identify barriers to discharge w/patient and caregiver- Arrange for needed discharge resources and transportation as appropriate- Identify discharge learning  needs (meds, wound care, etc.)- Arrange for interpretive services to assist at discharge as needed- Refer to Case Management Department for coordinating discharge planning if the patient needs post-hospital services based on physician/advanced practitioner order or complex needs related to functional status, cognitive ability, or social support system  Outcome: Progressing     Problem: Knowledge Deficit  Goal: Patient/family/caregiver demonstrates understanding of disease process, treatment plan, medications, and discharge instructions  Description: Complete learning assessment and assess knowledge base.Interventions:- Provide teaching at level of understanding- Provide teaching via preferred learning methods  Outcome: Progressing     Problem: Prexisting or High Potential for Compromised Skin Integrity  Goal: Skin integrity is maintained or improved  Description: INTERVENTIONS:- Identify patients at risk for skin breakdown- Assess and monitor skin integrity- Assess and monitor nutrition and hydration status- Monitor labs - Assess for incontinence - Turn and reposition patient- Assist with mobility/ambulation- Relieve pressure over bony prominences- Avoid friction and shearing- Provide appropriate hygiene as needed including keeping skin clean and dry- Evaluate need for skin moisturizer/barrier cream- Collaborate with interdisciplinary team - Patient/family teaching- Consider wound care consult   Outcome: Progressing     Problem: Nutrition/Hydration-ADULT  Goal: Nutrient/Hydration intake appropriate for improving, restoring or maintaining nutritional needs  Description: Monitor and assess patient's nutrition/hydration status for malnutrition. Collaborate with interdisciplinary team and initiate plan and interventions as ordered.  Monitor patient's weight and dietary intake as ordered or per policy. Utilize nutrition screening tool and intervene as necessary. Determine patient's food preferences and provide  high-protein, high-caloric foods as appropriate. INTERVENTIONS:- Monitor oral intake, urinary output, labs, and treatment plans- Assess nutrition and hydration status and recommend course of action- Evaluate amount of meals eaten- Assist patient with eating if necessary - Allow adequate time for meals- Recommend/ encourage appropriate diets, oral nutritional supplements, and vitamin/mineral supplements- Order, calculate, and assess calorie counts as needed- Recommend, monitor, and adjust tube feedings and TPN/PPN based on assessed needs- Assess need for intravenous fluids- Provide specific nutrition/hydration education as appropriate- Include patient/family/caregiver in decisions related to nutrition  Outcome: Progressing

## 2025-04-02 NOTE — UTILIZATION REVIEW
OBSERVATION 3/31/25@ 2217 CONVERTED TO INPATIENT 4/1/25@1626 DUE TO UNRESPONSIVE EPISODE   Initial Clinical Review    Admission: Date/Time/Statement:   Admission Orders (From admission, onward)       Ordered        04/01/25 1626  INPATIENT ADMISSION  Once            03/31/25 2217  Place in Observation  Once                          Orders Placed This Encounter   Procedures    Place in Observation     Standing Status:   Standing     Number of Occurrences:   1     Level of Care:   Med Surg [16]     Bed request comments:   tele    INPATIENT ADMISSION     Standing Status:   Standing     Number of Occurrences:   1     Level of Care:   Med Surg [16]     Estimated length of stay:   More than 2 Midnights     Certification:   I certify that inpatient services are medically necessary for this patient for a duration of greater than two midnights. See H&P and MD Progress Notes for additional information about the patient's course of treatment.     ED Arrival Information       Expected   -    Arrival   3/31/2025 18:31    Acuity   Urgent              Means of arrival   Ambulance    Escorted by   [facility]    Service   Hospitalist    Admission type   Emergency              Arrival complaint   Vomiting             Chief Complaint   Patient presents with    Vomiting     Pt arrives ems from home, son state n/v/d for about an hour. Hx of dementia        Initial Presentation: 84 y.o. female to the ED from home via EMS with complaints of vomiting, period of unresponsiveness. Admitted under observation then converted to inpatient for unresponsive episode, duodenitis. She has baseline dementia, advanced dementia, HTN, GERD, Breast CA s/p mastectomy, right adrenal gland mass . As per son, is essentially nonverbal at baseline.  Arrives to the ED at her baseline.  CT head shows no acute findings.  Monitor tele.  Continue asa. Check ECHo.  Cardiology consult. Trend troponin, EKG.  CT abd/pelvis notes duodenitis, Significant gaseous  distention of the colon without evidence of bowel obstruction or colitis. Monitor off abx.  Started on IV protonix. Appearing ill, has abdominal distension, guarding.       Date: 4/1   Day 2: Suspect reflex syncope.  Monitor tele. ECHO pending. Had not been taking bp meds at home. Restart.  Tolerating regular diet.  Per son, she is back to her baseline mentation.     Cardiology consult:  Unclear etiology of unresponsive episode, potentially vasovagal in nature.  BP elevated. Restart norvasc and amlodipine.  Check ECHO. Had not been taking bp meds at home.     Date: 4/2  Day 3: Has surpassed a 2nd midnight with active treatments and services. Admitted for unresponsive episode.  Treated with IV protonix, seen by cardiology.  ECHO WNL. Appears to be back to baseline.  Thought to be vaso vagal in nature.         ED Treatment-Medication Administration from 03/31/2025 1831 to 03/31/2025 2304         Date/Time Order Dose Route Action     03/31/2025 1902 sodium chloride 0.9 % bolus 1,000 mL 1,000 mL Intravenous New Bag     03/31/2025 1902 ondansetron (ZOFRAN) injection 4 mg 4 mg Intravenous Given     03/31/2025 1953 iohexol (OMNIPAQUE) 350 MG/ML injection (MULTI-DOSE) 100 mL 100 mL Intravenous Given     03/31/2025 2154 aspirin chewable tablet 324 mg 324 mg Oral Given            Scheduled Medications:  amLODIPine, 5 mg, Oral, Daily  aspirin, 81 mg, Oral, Daily  docusate sodium, 100 mg, Oral, BID  heparin (porcine), 5,000 Units, Subcutaneous, Q12H  pantoprazole, 40 mg, Intravenous, Q24H ERICH      Continuous IV Infusions:     PRN Meds:  acetaminophen, 650 mg, Oral, Q4H PRN  labetalol, 10 mg, Intravenous, Q6H PRN  ondansetron, 4 mg, Intravenous, Q6H PRN      ED Triage Vitals   Temperature Pulse Respirations Blood Pressure SpO2 Pain Score   03/31/25 1900 03/31/25 1834 03/31/25 1834 03/31/25 1834 03/31/25 1834 04/01/25 0722   98.1 °F (36.7 °C) 97 16 130/84 98 % No Pain     Weight (last 2 days) before discharge       Date/Time  Weight    04/02/25 1116 48.1 (106)    03/31/25 2300 48.1 (106.04)            Vital Signs (last 3 days) before discharge       Date/Time Temp Pulse Resp BP MAP (mmHg) SpO2 O2 Device Patient Position - Orthostatic VS Pain    04/02/25 1300 -- 75 -- -- -- -- -- -- --    04/02/25 1116 -- 78 -- 165/96 -- -- -- -- --    04/02/25 0740 -- -- -- -- -- -- -- -- No Pain    04/02/25 0700 -- 72 17 165/96 119 99 % None (Room air) Lying --    04/02/25 06:49:59 -- 75 -- 165/96 119 99 % -- -- --    04/02/25 02:35:22 -- 79 -- 143/86 105 99 % -- -- --    04/01/25 2300 97.8 °F (36.6 °C) 77 18 165/97 120 92 % None (Room air) Lying No Pain    04/01/25 19:52:09 97.9 °F (36.6 °C) 92 18 173/93 120 99 % None (Room air) Lying --    04/01/25 15:00:11 97.5 °F (36.4 °C) 73 17 162/94 117 99 % -- -- --    04/01/25 1500 -- -- 17 -- -- -- None (Room air) Lying --    04/01/25 11:05:23 97.6 °F (36.4 °C) 75 18 183/97 126 97 % -- Lying --    04/01/25 1100 97.6 °F (36.4 °C) -- 18 183/97 126 -- None (Room air) Lying --    04/01/25 0740 -- -- -- -- -- 99 % None (Room air) -- No Pain    04/01/25 07:22:43 98.1 °F (36.7 °C) 72 16 179/105 130 97 % None (Room air) Lying No Pain    04/01/25 03:27:26 97.7 °F (36.5 °C) 74 20 161/90 114 97 % None (Room air) Lying --    04/01/25 02:23:13 97.6 °F (36.4 °C) 77 21 160/87 111 98 % None (Room air) Lying --    04/01/25 01:14:50 -- 85 -- 163/89 114 99 % None (Room air) Lying --    04/01/25 0040 -- -- -- 185/108 134 -- -- -- --    04/01/25 00:37:20 -- 90 -- 176/122 140 57 % -- -- --    03/31/25 2342 98.2 °F (36.8 °C) 96 20 168/112 -- -- -- Sitting --    03/31/25 2100 -- 96 20 173/102 131 97 % -- -- --    03/31/25 2034 -- 97 20 -- -- 91 % -- -- --    03/31/25 1930 -- 85 22 149/86 110 98 % -- -- --    03/31/25 1900 98.1 °F (36.7 °C) 91 15 135/93 111 98 % -- -- --    03/31/25 1834 -- 97 16 130/84 102 98 % None (Room air) Lying --              Pertinent Labs/Diagnostic Test Results:   Radiology:  CT head without contrast   Final  Interpretation by Aubrey Bullock MD (03/31 2125)      No acute intracranial abnormality.      Stable microangiopathic changes.                  Workstation performed: [id number]         CT chest abdomen pelvis w contrast   Final Interpretation by Christopher Amaya MD (03/31 2110)         1. No evidence of acute cardiopulmonary process.   2. Mild wall thickening in the second portion of the duodenum could represent duodenitis. No findings to indicate gastritis or enteritis. Significant gaseous distention of the colon without evidence of bowel obstruction or colitis.   3. No findings to indicate acute trauma in the chest, abdomen or pelvis.               Workstation performed: [id number]         XR chest 1 view portable   ED Interpretation by Missy Guerrier DO (03/31 1958)   Stable significant left elevated hemidiaphragm with colonic distention.  Heart and mediastinum shifted rightward.  No significant change from prior chest x-ray.      Final Interpretation by Staci Lechuga MD (04/01 0859)      No acute cardiopulmonary disease.      Chronic distention of the colon with chronic severe elevation of the left diaphragm.            Workstation performed: [id number]           Cardiology:  4/2 ECHO:   Interpretation Summary         Technically difficult study due to decreased penetration, lung interference, restricted mobility and poor acoustic windows.    Left Ventricle: Wall thickness is severely increased. There is severe concentric hypertrophy. The left ventricular ejection fraction is 55-60%. Systolic function is normal. Diastolic function is mildly abnormal, consistent with grade I (abnormal) relaxation.    Right Ventricle: Systolic function is normal. Normal tricuspid annular plane systolic excursion (TAPSE) > 1.7 cm.    Mitral Valve: There is mild regurgitation.    Tricuspid Valve: There is mild regurgitation  ECG 12 lead   Final Result by Shruti Kimbrough MD (1942)   Normal sinus  rhythm   Biatrial enlargement   Right bundle branch block   Left anterior fascicular block   Bifascicular block    T wave abnormality, consider lateral ischemia   Abnormal ECG      Confirmed by Shruti Kimbrough ([id number]) on 4/1/2025 7:42:11 PM      ECG 12 lead   Final Result by Shruti Kimbrough MD (04/01 1944)   Normal sinus rhythm   Biatrial enlargement   Left axis deviation   Pulmonary disease pattern   Incomplete right bundle branch block   Abnormal ECG      Confirmed by Shruti Kimbrough ([id number]) on 4/1/2025 7:44:30 PM      ECG 12 lead   Final Result by Shruti Kimbrough MD (04/01 1944)   Normal sinus rhythm   Biatrial enlargement   Right bundle branch block   Left anterior fascicular block    Bifascicular block   Abnormal ECG      Confirmed by Shruti Kimbrough ([id number]) on 4/1/2025 7:44:55 PM      ECG 12 lead   Final Result by Shruti Kimbrough MD (03/31 2142)   Normal sinus rhythm   Biatrial enlargement   Left axis deviation   Non-specific intra-ventricular conduction block   T wave abnormality, consider anterior ischemia   Abnormal ECG   Baseline artifact   Confirmed by Shruti Kimbrough ([id number]) on 3/31/2025 9:42:49 PM      ECG 12 lead   Final Result by Shruti Kimbrough MD (03/31 2144)   Normal sinus rhythm   Right atrial enlargement   Pulmonary disease pattern   Left anterior fascicular block   Minimal voltage criteria for LVH, may be normal variant   T wave abnormality, consider anterior ischemia   Abnormal ECG      Confirmed by Shruti Kimbrough ([id number]) on 3/31/2025 9:44:49 PM          Results from last 7 days   Lab Units 04/01/25 0420 03/31/25  1855   WBC Thousand/uL 4.50 4.18*   HEMOGLOBIN g/dL 13.1 12.3   HEMATOCRIT % 41.4 39.6   PLATELETS Thousands/uL 208 200   TOTAL NEUT ABS Thousands/µL 2.58 1.96         Results from last 7 days   Lab Units 04/01/25  0420 03/31/25  1855   SODIUM mmol/L 139 142   POTASSIUM mmol/L 4.3 4.2   CHLORIDE mmol/L 108 109*   CO2 mmol/L 25 27   ANION GAP mmol/L 6 6   BUN mg/dL 16 19    CREATININE mg/dL 1.12 1.19   EGFR ml/min/1.73sq m 45 42   CALCIUM mg/dL 9.0 9.8   MAGNESIUM mg/dL 2.0 1.9     Results from last 7 days   Lab Units 03/31/25  1855   AST U/L 24   ALT U/L 12   ALK PHOS U/L 41   TOTAL PROTEIN g/dL 6.6   ALBUMIN g/dL 4.1   TOTAL BILIRUBIN mg/dL 0.83   BILIRUBIN DIRECT mg/dL 0.05         Results from last 7 days   Lab Units 04/01/25  0420 03/31/25  1855   GLUCOSE RANDOM mg/dL 99 75           Results from last 7 days   Lab Units 03/31/25  2343 03/31/25  2104 03/31/25  1855   HS TNI 0HR ng/L  --   --  51*   HS TNI 2HR ng/L  --  64*  --    HSTNI D2 ng/L  --  13  --    HS TNI 4HR ng/L 85*  --   --    HSTNI D4 ng/L 34*  --   --          Results from last 7 days   Lab Units 03/31/25  1855   LIPASE u/L 39                 Results from last 7 days   Lab Units 04/01/25  0415   CLARITY UA  Clear   COLOR UA  Colorless   SPEC GRAV UA  1.019   PH UA  7.5   GLUCOSE UA mg/dl Negative   KETONES UA mg/dl Negative   BLOOD UA  Negative   PROTEIN UA mg/dl Negative   NITRITE UA  Negative   BILIRUBIN UA  Negative   UROBILINOGEN UA (BE) mg/dl <2.0   LEUKOCYTES UA  Negative                                                   Past Medical History:   Diagnosis Date    Acute kidney injury (HCC) 1/3/2022    Dementia (HCC)     Hypertension      Present on Admission:   Dementia without behavioral disturbance (HCC)   Hypertension   Elevated troponin level      Admitting Diagnosis: Vomiting [R11.10]  Elevated troponin [R79.89]  Episode of unresponsiveness [R40.4]  Age/Sex: 84 y.o. female    Network Utilization Review Department  ATTENTION: Please call with any questions or concerns to [phone] and carefully listen to the prompts so that you are directed to the right person. All voicemails are confidential.   For Discharge needs, contact Care Management DC Support Team at [phone] opt. 2  Send all requests for admission clinical reviews, approved or denied determinations and any other requests to dedicated fax  number below belonging to the campus where the patient is receiving treatment. List of dedicated fax numbers for the Facilities:  FACILITY NAME UR FAX NUMBER   ADMISSION DENIALS (Administrative/Medical Necessity) [phone]   DISCHARGE SUPPORT TEAM (NETWORK) [phone]   PARENT CHILD HEALTH (Maternity/NICU/Pediatrics) [phone]   [facility] [phone]   [facility] [phone]   [facility] [phone]   [facility] [phone]   [facility] [phone]   [facility] [phone]   [facility] [phone]   [facility] [phone]   [facility] [phone]   [facility] [phone]   [facility] [phone]   [facility] [phone]

## 2025-04-02 NOTE — PLAN OF CARE
Problem: PAIN - ADULT  Goal: Verbalizes/displays adequate comfort level or baseline comfort level  Description: Interventions:- Encourage patient to monitor pain and request assistance- Assess pain using appropriate pain scale- Administer analgesics based on type and severity of pain and evaluate response- Implement non-pharmacological measures as appropriate and evaluate response- Consider cultural and social influences on pain and pain management- Notify physician/advanced practitioner if interventions unsuccessful or patient reports new pain  Outcome: Progressing     Problem: INFECTION - ADULT  Goal: Absence or prevention of progression during hospitalization  Description: INTERVENTIONS:- Assess and monitor for signs and symptoms of infection- Monitor lab/diagnostic results- Monitor all insertion sites, i.e. indwelling lines, tubes, and drains- Monitor endotracheal if appropriate and nasal secretions for changes in amount and color- Lingle appropriate cooling/warming therapies per order- Administer medications as ordered- Instruct and encourage patient and family to use good hand hygiene technique- Identify and instruct in appropriate isolation precautions for identified infection/condition  Outcome: Progressing  Goal: Absence of fever/infection during neutropenic period  Description: INTERVENTIONS:- Monitor WBC  Outcome: Progressing     Problem: DISCHARGE PLANNING  Goal: Discharge to home or other facility with appropriate resources  Description: INTERVENTIONS:- Identify barriers to discharge w/patient and caregiver- Arrange for needed discharge resources and transportation as appropriate- Identify discharge learning needs (meds, wound care, etc.)- Arrange for interpretive services to assist at discharge as needed- Refer to Case Management Department for coordinating discharge planning if the patient needs post-hospital services based on physician/advanced practitioner order or complex needs related to  functional status, cognitive ability, or social support system  Outcome: Progressing

## 2025-04-04 NOTE — DISCHARGE SUMMARY
Discharge Summary - [facility] Internal Medicine  Patient: Nyasia Swartz 84 y.o. female   MRN: [id number]  PCP: No primary care provider on file.  Unit/Bed#: 2 E 279-01 Encounter: [id number]            Discharging Physician / Practitioner: Jenna Jackson MD  PCP: No primary care provider on file.  Admission Date:   Admission Orders (From admission, onward)       Ordered        04/01/25 1626  INPATIENT ADMISSION  Once            03/31/25 2217  Place in Observation  Once                          Discharge Date: 04/02/25      Reason for Admission:  Syncopal type episode      Discharge Diagnoses:     Principal Problem:    Unresponsive episode, suspect vasovagal syncope    Active Problems:    Essential hypertension    Dementia without behavioral disturbance (HCC)    Duodenitis    Non-MI troponin elevation    Severe protein-calorie malnutrition (HCC)      Consultations During Hospital Stay:  Cardiology      Hospital Course:     Nyasia Swartz is a 84 y.o. female patient who originally presented to the hospital on 3/31/2025 due to a syncopal type episode with transient resolution after eating a meal.  Mild troponin elevation up to 85 was noted and cardiology was consulted.  Echocardiogram with normal EF with only mild valvular disease.  Etiology was presumed secondary to vasovagal episode, possibly associated with a transient elevation of blood pressures.  Will continue her PPI regimen for duodenitis, and Norvasc for hypertension.  Through hospital course, she was noted to be back to baseline mentation with a history of underlying dementia.  She will be discharged home with family caretaking support.  Son, at bedside, agrees with and understands plan.      Condition at Discharge: fair       Discharge Day Visit / Exam:     Vitals:  Blood Pressure: 165/96 (04/02/25 1116)  Pulse: 75 (04/02/25 1300)  Temperature: 97.8 °F (36.6 °C) (04/01/25 2300)  Temp Source: Axillary (04/01/25 2300)  Respirations: 17 (04/02/25  "0700)  Height: 5' 5\" (165.1 cm) (04/02/25 1116)  Weight - Scale: 48.1 kg (106 lb) (04/02/25 1116)  SpO2: 99 % (04/02/25 0700)      Physical exam:  I had a face-to-face encounter with the patient on day of discharge.      Discussion with Patient and/or Family:  The patient has been advised to return to the ER immediately if any symptoms recur or worsen.       Discharge instructions/Information to Patient and/or Family:   See after visit summary for information provided to patient and/or family.        Provisions for Follow-Up Care:  See after visit summary for information related to follow-up care and any pertinent home health orders.        Disposition:   Home      Discharge Medications:  See after visit summary for reconciled discharge medications provided to patient and/or family.        Discharge Statement:  I spent 38 minutes discharging the patient. This time was spent on the day of discharge. I had direct contact with the patient on the day of discharge. Greater than 50% of the total time was spent examining patient, answering all patient questions, arranging and discussing plan of care with patient as well as directly providing post-discharge instructions.  Additional time then spent on discharge activities.           YANELI HERNANDEZ MD   Hospitalist - [facility] Internal Medicine        ** Please Note: This note is constructed using a voice recognition dictation system.  An occasional wrong word/phrase or “sound-a-like” substitution may have been picked up by dictation device due to the inherent limitations of voice recognition software.  Read the chart carefully and recognize, using reasonable context, where substitutions may have occurred.**   "